# Patient Record
Sex: MALE | Race: WHITE | NOT HISPANIC OR LATINO | Employment: FULL TIME | URBAN - METROPOLITAN AREA
[De-identification: names, ages, dates, MRNs, and addresses within clinical notes are randomized per-mention and may not be internally consistent; named-entity substitution may affect disease eponyms.]

---

## 2020-04-01 ENCOUNTER — TELEPHONE (OUTPATIENT)
Dept: FAMILY MEDICINE CLINIC | Facility: CLINIC | Age: 50
End: 2020-04-01

## 2020-08-11 ENCOUNTER — OFFICE VISIT (OUTPATIENT)
Dept: FAMILY MEDICINE CLINIC | Facility: CLINIC | Age: 50
End: 2020-08-11
Payer: COMMERCIAL

## 2020-08-11 VITALS
DIASTOLIC BLOOD PRESSURE: 60 MMHG | WEIGHT: 157 LBS | OXYGEN SATURATION: 96 % | TEMPERATURE: 97.5 F | SYSTOLIC BLOOD PRESSURE: 118 MMHG | RESPIRATION RATE: 16 BRPM | BODY MASS INDEX: 24.64 KG/M2 | HEART RATE: 86 BPM | HEIGHT: 67 IN

## 2020-08-11 DIAGNOSIS — Z13.6 SCREENING FOR CARDIOVASCULAR, RESPIRATORY, AND GENITOURINARY DISEASES: ICD-10-CM

## 2020-08-11 DIAGNOSIS — Z00.00 HEALTHCARE MAINTENANCE: Primary | ICD-10-CM

## 2020-08-11 DIAGNOSIS — Z13.83 SCREENING FOR CARDIOVASCULAR, RESPIRATORY, AND GENITOURINARY DISEASES: ICD-10-CM

## 2020-08-11 DIAGNOSIS — Z12.5 PROSTATE CANCER SCREENING: ICD-10-CM

## 2020-08-11 DIAGNOSIS — Z71.89 ADVICE GIVEN ABOUT 2019 NOVEL CORONAVIRUS INFECTION: ICD-10-CM

## 2020-08-11 DIAGNOSIS — Z13.89 SCREENING FOR CARDIOVASCULAR, RESPIRATORY, AND GENITOURINARY DISEASES: ICD-10-CM

## 2020-08-11 DIAGNOSIS — B00.2 HERPES GINGIVOSTOMATITIS: ICD-10-CM

## 2020-08-11 DIAGNOSIS — Z13.1 SCREENING FOR DIABETES MELLITUS (DM): ICD-10-CM

## 2020-08-11 DIAGNOSIS — B35.1 ONYCHOMYCOSIS: ICD-10-CM

## 2020-08-11 DIAGNOSIS — F17.210 CIGARETTE NICOTINE DEPENDENCE WITHOUT COMPLICATION: ICD-10-CM

## 2020-08-11 DIAGNOSIS — Z12.11 COLON CANCER SCREENING: ICD-10-CM

## 2020-08-11 DIAGNOSIS — Z20.822 ENCOUNTER FOR SCREENING LABORATORY TESTING FOR SEVERE ACUTE RESPIRATORY SYNDROME CORONAVIRUS 2 (SARS-COV-2): ICD-10-CM

## 2020-08-11 DIAGNOSIS — J30.9 ALLERGIC RHINITIS, UNSPECIFIED SEASONALITY, UNSPECIFIED TRIGGER: ICD-10-CM

## 2020-08-11 PROCEDURE — 3008F BODY MASS INDEX DOCD: CPT | Performed by: FAMILY MEDICINE

## 2020-08-11 PROCEDURE — 4004F PT TOBACCO SCREEN RCVD TLK: CPT | Performed by: FAMILY MEDICINE

## 2020-08-11 PROCEDURE — 99386 PREV VISIT NEW AGE 40-64: CPT | Performed by: FAMILY MEDICINE

## 2020-08-11 PROCEDURE — 3725F SCREEN DEPRESSION PERFORMED: CPT | Performed by: FAMILY MEDICINE

## 2020-08-11 RX ORDER — TERBINAFINE HYDROCHLORIDE 250 MG/1
250 TABLET ORAL DAILY
Qty: 90 TABLET | Refills: 0 | Status: SHIPPED | OUTPATIENT
Start: 2020-08-11 | End: 2020-11-09

## 2020-08-11 RX ORDER — CETIRIZINE HYDROCHLORIDE 10 MG/1
10 TABLET, CHEWABLE ORAL DAILY
COMMUNITY

## 2020-08-11 RX ORDER — VALACYCLOVIR HYDROCHLORIDE 1 G/1
TABLET, FILM COATED ORAL
Qty: 40 TABLET | Refills: 1 | Status: SHIPPED | OUTPATIENT
Start: 2020-08-11 | End: 2022-01-14

## 2020-08-11 NOTE — PROGRESS NOTES
Assessment/Plan:    No problem-specific Assessment & Plan notes found for this encounter  Cpe today    hsv labialis stable, use instructed, not for aphthous ulcers  Screening labs  Tobacco use discouraged  AR stable  Onychomycosis new, start lamisil after LFTs normal, f/u in 6weeks for recheck, risks advised     Diagnoses and all orders for this visit:    Healthcare maintenance    Allergic rhinitis, unspecified seasonality, unspecified trigger    Cigarette nicotine dependence without complication    Colon cancer screening  -     Ambulatory referral to Gastroenterology; Future    Herpes gingivostomatitis  -     valACYclovir (VALTREX) 1,000 mg tablet; Take 2 grams at earliest onset of cold sore then repeat once more in 12 hours    Screening for cardiovascular, respiratory, and genitourinary diseases  -     Lipid Panel with Direct LDL reflex; Future    Screening for diabetes mellitus (DM)  -     Comprehensive metabolic panel; Future    Prostate cancer screening  -     PSA, Total Screen; Future    Onychomycosis  -     terbinafine (LamISIL) 250 mg tablet; Take 1 tablet (250 mg total) by mouth daily    Advice given about 2019 novel coronavirus infection  -     Novel Coronavirus (COVID-19), PCR [facility] - Collected at [facility] or [facility]; Future    Other orders  -     cetirizine (ZyrTEC) 10 MG chewable tablet; Chew 10 mg daily        Return in about 6 weeks (around 9/22/2020) for Recheck  Subjective:      Patient ID: Herminia Villalta is a 45 y o  male      Chief Complaint   Patient presents with    Physical Exam     jlopezcma     COVID-19       HPI  Valtrex for cold sores, helps, gets about 2x/year  Not seen in 3-4y    Travels a lot  [employer]    No labs in past 3-4y    No colonoscopy ever    Healthy diet  Garden  Veggies  Fish and meat  Some walking    Smokes   Risk aware  Addicted to nicotine per pt  Declines chantix    The following portions of the patient's history were reviewed and updated as appropriate: allergies, current medications, past family history, past medical history, past social history, past surgical history and problem list     Review of Systems   Constitutional: Negative for fever  Respiratory: Negative for shortness of breath  Current Outpatient Medications   Medication Sig Dispense Refill    cetirizine (ZyrTEC) 10 MG chewable tablet Chew 10 mg daily      valACYclovir (VALTREX) 1,000 mg tablet Take 2 grams at earliest onset of cold sore then repeat once more in 12 hours 40 tablet 1    terbinafine (LamISIL) 250 mg tablet Take 1 tablet (250 mg total) by mouth daily 90 tablet 0     No current facility-administered medications for this visit  Objective:    /60   Pulse 86   Temp 97 5 °F (36 4 °C)   Resp 16   Ht 5' 7" (1 702 m)   Wt 71 2 kg (157 lb)   SpO2 96%   BMI 24 59 kg/m²        Physical Exam  Vitals signs and nursing note reviewed  Constitutional:       Appearance: He is well-developed  He is not ill-appearing or diaphoretic  HENT:      Head: Normocephalic  Right Ear: Tympanic membrane normal       Left Ear: Tympanic membrane normal       Nose: No rhinorrhea  Mouth/Throat:      Pharynx: No posterior oropharyngeal erythema  Eyes:      General: No scleral icterus  Conjunctiva/sclera: Conjunctivae normal    Neck:      Musculoskeletal: Neck supple  No neck rigidity  Vascular: No carotid bruit  Cardiovascular:      Rate and Rhythm: Normal rate and regular rhythm  Heart sounds: No murmur  Pulmonary:      Effort: Pulmonary effort is normal  No respiratory distress  Breath sounds: No wheezing or rales  Abdominal:      General: There is no distension  Palpations: Abdomen is soft  Tenderness: There is no abdominal tenderness  Hernia: No hernia is present  Genitourinary:     Penis: Normal        Scrotum/Testes: Normal       Prostate: Normal       Rectum: Normal    Musculoskeletal:         General: No deformity     Skin: General: Skin is warm and dry  Coloration: Skin is not pale  Findings: No rash  Comments: B/l onychomycosis   Neurological:      Mental Status: He is alert  Motor: No weakness  Psychiatric:         Behavior: Behavior normal          Thought Content: Thought content normal          Tobacco Cessation Counseling: Tobacco cessation counseling was provided  The patient is sincerely urged to quit consumption of tobacco  He is not ready to quit tobacco  Medication options discussed              Omer Bai DO

## 2020-08-24 DIAGNOSIS — Z71.89 ADVICE GIVEN ABOUT 2019 NOVEL CORONAVIRUS INFECTION: ICD-10-CM

## 2020-08-24 PROCEDURE — U0003 INFECTIOUS AGENT DETECTION BY NUCLEIC ACID (DNA OR RNA); SEVERE ACUTE RESPIRATORY SYNDROME CORONAVIRUS 2 (SARS-COV-2) (CORONAVIRUS DISEASE [COVID-19]), AMPLIFIED PROBE TECHNIQUE, MAKING USE OF HIGH THROUGHPUT TECHNOLOGIES AS DESCRIBED BY CMS-2020-01-R: HCPCS | Performed by: FAMILY MEDICINE

## 2020-08-25 LAB
ALBUMIN SERPL-MCNC: 4.6 G/DL (ref 3.8–4.9)
ALBUMIN/GLOB SERPL: 2 {RATIO} (ref 1.2–2.2)
ALP SERPL-CCNC: 55 IU/L (ref 39–117)
ALT SERPL-CCNC: 16 IU/L (ref 0–44)
AST SERPL-CCNC: 17 IU/L (ref 0–40)
BILIRUB SERPL-MCNC: 0.4 MG/DL (ref 0–1.2)
BUN SERPL-MCNC: 12 MG/DL (ref 6–24)
BUN/CREAT SERPL: 13 (ref 9–20)
CALCIUM SERPL-MCNC: 9.8 MG/DL (ref 8.7–10.2)
CHLORIDE SERPL-SCNC: 103 MMOL/L (ref 96–106)
CHOLEST SERPL-MCNC: 235 MG/DL (ref 100–199)
CO2 SERPL-SCNC: 23 MMOL/L (ref 20–29)
CREAT SERPL-MCNC: 0.9 MG/DL (ref 0.76–1.27)
GLOBULIN SER-MCNC: 2.3 G/DL (ref 1.5–4.5)
GLUCOSE SERPL-MCNC: 100 MG/DL (ref 65–99)
HDLC SERPL-MCNC: 53 MG/DL
LDLC SERPL CALC-MCNC: 167 MG/DL (ref 0–99)
MICRODELETION SYND BLD/T FISH: NORMAL
POTASSIUM SERPL-SCNC: 4.4 MMOL/L (ref 3.5–5.2)
PROT SERPL-MCNC: 6.9 G/DL (ref 6–8.5)
PSA SERPL-MCNC: 3.1 NG/ML (ref 0–4)
SARS-COV-2 RNA SPEC QL NAA+PROBE: NOT DETECTED
SL AMB EGFR AFRICAN AMERICAN: 114 ML/MIN/1.73
SL AMB EGFR NON AFRICAN AMERICAN: 99 ML/MIN/1.73
SODIUM SERPL-SCNC: 139 MMOL/L (ref 134–144)
TRIGL SERPL-MCNC: 75 MG/DL (ref 0–149)

## 2020-09-22 ENCOUNTER — OFFICE VISIT (OUTPATIENT)
Dept: FAMILY MEDICINE CLINIC | Facility: CLINIC | Age: 50
End: 2020-09-22
Payer: COMMERCIAL

## 2020-09-22 VITALS
WEIGHT: 160.6 LBS | HEIGHT: 67 IN | BODY MASS INDEX: 25.21 KG/M2 | DIASTOLIC BLOOD PRESSURE: 70 MMHG | RESPIRATION RATE: 18 BRPM | SYSTOLIC BLOOD PRESSURE: 114 MMHG | HEART RATE: 68 BPM | TEMPERATURE: 96.7 F | OXYGEN SATURATION: 98 %

## 2020-09-22 DIAGNOSIS — R73.03 PREDIABETES: ICD-10-CM

## 2020-09-22 DIAGNOSIS — B35.1 ONYCHOMYCOSIS: Primary | ICD-10-CM

## 2020-09-22 PROCEDURE — 1036F TOBACCO NON-USER: CPT | Performed by: FAMILY MEDICINE

## 2020-09-22 PROCEDURE — 99213 OFFICE O/P EST LOW 20 MIN: CPT | Performed by: FAMILY MEDICINE

## 2020-09-22 NOTE — PROGRESS NOTES
Assessment/Plan:    No problem-specific Assessment & Plan notes found for this encounter  Onychomycosis, start lamisil, labs slips given for LFT after 6 and 12 w    Prediabetes, new, diet reviewed    Getting [id number] at work? Diagnoses and all orders for this visit:    Onychomycosis  -     Hepatic function panel; Future  -     Hepatic function panel; Future    Prediabetes        Return if symptoms worsen or fail to improve  Subjective:      Patient ID: Thiago Vargas is a 45 y o  male  Chief Complaint   Patient presents with    Follow-up     6wk f/u  Adelaida Campbell       HPI    Here for follow up  ascvd 3 7%  psa 3 1  Filled rx for lamisil but not started yet    Quit tob 10d ago  Doing ok for now    fbs 100  Prediabetes advised  Diet and carbs discussed  Generally eats very healthy per pt    fram score 3 7%    The following portions of the patient's history were reviewed and updated as appropriate: allergies, current medications, past family history, past medical history, past social history, past surgical history and problem list     Review of Systems   Respiratory: Negative for shortness of breath  Cardiovascular: Negative for chest pain  Gastrointestinal: Negative for abdominal pain  Current Outpatient Medications   Medication Sig Dispense Refill    cetirizine (ZyrTEC) 10 MG chewable tablet Chew 10 mg daily      terbinafine (LamISIL) 250 mg tablet Take 1 tablet (250 mg total) by mouth daily 90 tablet 0    valACYclovir (VALTREX) 1,000 mg tablet Take 2 grams at earliest onset of cold sore then repeat once more in 12 hours 40 tablet 1     No current facility-administered medications for this visit  Objective:    /70   Pulse 68   Temp (!) 96 7 °F (35 9 °C)   Resp 18   Ht 5' 7" (1 702 m)   Wt 72 8 kg (160 lb 9 6 oz)   SpO2 98%   BMI 25 15 kg/m²        Physical Exam  Vitals signs and nursing note reviewed  Constitutional:       General: He is not in acute distress  Appearance: Normal appearance  He is well-developed  He is not ill-appearing  HENT:      Head: Normocephalic  Right Ear: Tympanic membrane and ear canal normal       Left Ear: Tympanic membrane and ear canal normal       Nose: No rhinorrhea  Mouth/Throat:      Pharynx: No posterior oropharyngeal erythema  Eyes:      Conjunctiva/sclera: Conjunctivae normal    Neck:      Musculoskeletal: Neck supple  Cardiovascular:      Rate and Rhythm: Normal rate and regular rhythm  Heart sounds: No murmur  Pulmonary:      Effort: Pulmonary effort is normal  No respiratory distress  Abdominal:      General: There is no distension  Palpations: Abdomen is soft  There is no mass  Tenderness: There is no abdominal tenderness  Musculoskeletal:         General: No deformity  Skin:     General: Skin is warm and dry  Coloration: Skin is not pale  Neurological:      Mental Status: He is alert  Psychiatric:         Behavior: Behavior normal          Thought Content: Thought content normal          BMI Counseling: Body mass index is 25 15 kg/m²  The BMI is above normal  Nutrition recommendations include decreasing portion sizes and moderation in carbohydrate intake  Exercise recommendations include exercising 3-5 times per week  No pharmacotherapy was ordered                Wendy Tay DO

## 2020-10-23 PROBLEM — Z20.822 ENCOUNTER FOR SCREENING LABORATORY TESTING FOR SEVERE ACUTE RESPIRATORY SYNDROME CORONAVIRUS 2 (SARS-COV-2): Status: ACTIVE | Noted: 2020-10-23

## 2020-10-23 PROBLEM — Z11.52 ENCOUNTER FOR SCREENING LABORATORY TESTING FOR SEVERE ACUTE RESPIRATORY SYNDROME CORONAVIRUS 2 (SARS-COV-2): Status: ACTIVE | Noted: 2020-10-23

## 2022-01-14 ENCOUNTER — OFFICE VISIT (OUTPATIENT)
Dept: FAMILY MEDICINE CLINIC | Facility: CLINIC | Age: 52
End: 2022-01-14
Payer: COMMERCIAL

## 2022-01-14 VITALS
DIASTOLIC BLOOD PRESSURE: 70 MMHG | OXYGEN SATURATION: 100 % | SYSTOLIC BLOOD PRESSURE: 120 MMHG | RESPIRATION RATE: 20 BRPM | WEIGHT: 162 LBS | BODY MASS INDEX: 25.43 KG/M2 | HEART RATE: 96 BPM | HEIGHT: 67 IN | TEMPERATURE: 97.6 F

## 2022-01-14 DIAGNOSIS — Z12.11 COLON CANCER SCREENING: ICD-10-CM

## 2022-01-14 DIAGNOSIS — Z12.5 PROSTATE CANCER SCREENING: ICD-10-CM

## 2022-01-14 DIAGNOSIS — Z13.6 SCREENING FOR CARDIOVASCULAR, RESPIRATORY, AND GENITOURINARY DISEASES: ICD-10-CM

## 2022-01-14 DIAGNOSIS — B00.2 HERPES GINGIVOSTOMATITIS: ICD-10-CM

## 2022-01-14 DIAGNOSIS — R73.03 PREDIABETES: ICD-10-CM

## 2022-01-14 DIAGNOSIS — B35.1 ONYCHOMYCOSIS: ICD-10-CM

## 2022-01-14 DIAGNOSIS — Z13.83 SCREENING FOR CARDIOVASCULAR, RESPIRATORY, AND GENITOURINARY DISEASES: ICD-10-CM

## 2022-01-14 DIAGNOSIS — Z00.00 HEALTHCARE MAINTENANCE: Primary | ICD-10-CM

## 2022-01-14 DIAGNOSIS — Z13.1 SCREENING FOR DIABETES MELLITUS (DM): ICD-10-CM

## 2022-01-14 DIAGNOSIS — K64.4 EXTERNAL HEMORRHOIDS: ICD-10-CM

## 2022-01-14 DIAGNOSIS — Z13.89 SCREENING FOR CARDIOVASCULAR, RESPIRATORY, AND GENITOURINARY DISEASES: ICD-10-CM

## 2022-01-14 PROCEDURE — 3008F BODY MASS INDEX DOCD: CPT | Performed by: FAMILY MEDICINE

## 2022-01-14 PROCEDURE — 3725F SCREEN DEPRESSION PERFORMED: CPT | Performed by: FAMILY MEDICINE

## 2022-01-14 PROCEDURE — 1036F TOBACCO NON-USER: CPT | Performed by: FAMILY MEDICINE

## 2022-01-14 PROCEDURE — 99396 PREV VISIT EST AGE 40-64: CPT | Performed by: FAMILY MEDICINE

## 2022-01-14 NOTE — PROGRESS NOTES
Assessment/Plan:    No problem-specific Assessment & Plan notes found for this encounter  cpe    Colonoscopy preferred over cologuard as explained    hsv labialis better with valtrex, try to prevent when possible    Persistent toenail deformity x 1, left foot, s/p lamisil po in 2020  Offer podiatry for treatment options    Ext hemorrhoids noted, he will discuss tx options with gi     Diagnoses and all orders for this visit:    Healthcare maintenance    Colon cancer screening  -     Ambulatory referral to Gastroenterology; Future    Herpes gingivostomatitis    Prediabetes  -     Hemoglobin A1C; Future    Screening for cardiovascular, respiratory, and genitourinary diseases  -     Comprehensive metabolic panel; Future    Screening for diabetes mellitus (DM)  -     Lipid Panel with Direct LDL reflex; Future    Prostate cancer screening  -     PSA, Total Screen; Future    Onychomycosis  -     Ambulatory referral to Podiatry; Future    External hemorrhoids        Return if symptoms worsen or fail to improve  Subjective:      Patient ID: Scar Quiñonez is a 45 y o  male  Chief Complaint   Patient presents with    Physical Exam     jlopezcma        HPI  Quit tob 18m ago  Over 20 pack year history  Had 3 pfizers    Valtrex prn helps    The following portions of the patient's history were reviewed and updated as appropriate: allergies, current medications, past family history, past medical history, past social history, past surgical history and problem list     Review of Systems   Constitutional: Negative for fever  Respiratory: Negative for shortness of breath  Current Outpatient Medications   Medication Sig Dispense Refill    cetirizine (ZyrTEC) 10 MG chewable tablet Chew 10 mg daily      valACYclovir (VALTREX) 1,000 mg tablet Take 2 grams at earliest onset of cold sore then repeat once more in 12 hours 40 tablet 1     No current facility-administered medications for this visit  Objective:    /70   Pulse 96   Temp 97 6 °F (36 4 °C)   Resp 20   Ht 5' 7" (1 702 m)   Wt 73 5 kg (162 lb)   SpO2 100%   BMI 25 37 kg/m²        Physical Exam  Vitals and nursing note reviewed  Constitutional:       General: He is not in acute distress  Appearance: He is well-developed  He is not ill-appearing  HENT:      Head: Normocephalic  Right Ear: Tympanic membrane normal       Left Ear: Tympanic membrane normal       Nose: No congestion  Eyes:      General: No scleral icterus  Conjunctiva/sclera: Conjunctivae normal    Cardiovascular:      Rate and Rhythm: Normal rate and regular rhythm  Heart sounds: No murmur heard  Pulmonary:      Effort: Pulmonary effort is normal  No respiratory distress  Breath sounds: No wheezing  Abdominal:      General: There is no distension  Palpations: Abdomen is soft  Tenderness: There is no abdominal tenderness  Hernia: No hernia is present  Genitourinary:     Penis: Normal        Testes: Normal       Prostate: Normal       Rectum: Normal    Musculoskeletal:         General: Deformity present  Cervical back: Neck supple  Right lower leg: No edema  Left lower leg: No edema  Comments: Left toenail x 1   Skin:     General: Skin is warm and dry  Coloration: Skin is not pale  Neurological:      Mental Status: He is alert  Motor: No weakness  Gait: Gait normal    Psychiatric:         Mood and Affect: Mood normal          Behavior: Behavior normal          Thought Content:  Thought content normal                 Elizabeth Calvert DO

## 2022-02-02 ENCOUNTER — TELEPHONE (OUTPATIENT)
Dept: GASTROENTEROLOGY | Facility: CLINIC | Age: 52
End: 2022-02-02

## 2022-02-02 NOTE — TELEPHONE ENCOUNTER
Received order in [address] [place] [address] for pt to be scheduled for colon screening per Dr Evaristo Lovett  Pt is a new pt to us  I lmom for pt to please call back to schedule a colon screening per Dr Evaristo Lovett  Will call pt again in one week if do not hear back from him

## 2022-02-09 NOTE — TELEPHONE ENCOUNTER
I lmoc for pt to please call back to schedule a colon screening per Dr Antionette Aleman  Will call pt again in two weeks if do not hear back from him

## 2022-02-23 NOTE — TELEPHONE ENCOUNTER
I lmom for pt to please call back to schedule a colonoscopy screening per Dr Antionette Aleman  Will wait for pt's call back at this time

## 2022-09-15 ENCOUNTER — VBI (OUTPATIENT)
Dept: ADMINISTRATIVE | Facility: OTHER | Age: 52
End: 2022-09-15

## 2022-10-21 ENCOUNTER — OFFICE VISIT (OUTPATIENT)
Dept: FAMILY MEDICINE CLINIC | Facility: CLINIC | Age: 52
End: 2022-10-21
Payer: COMMERCIAL

## 2022-10-21 VITALS
RESPIRATION RATE: 20 BRPM | OXYGEN SATURATION: 98 % | HEIGHT: 67 IN | TEMPERATURE: 98.1 F | DIASTOLIC BLOOD PRESSURE: 70 MMHG | HEART RATE: 97 BPM | BODY MASS INDEX: 26.37 KG/M2 | WEIGHT: 168 LBS | SYSTOLIC BLOOD PRESSURE: 120 MMHG

## 2022-10-21 DIAGNOSIS — J01.00 ACUTE NON-RECURRENT MAXILLARY SINUSITIS: ICD-10-CM

## 2022-10-21 DIAGNOSIS — U07.1 COVID-19 VIRUS INFECTION: Primary | ICD-10-CM

## 2022-10-21 DIAGNOSIS — B00.2 HERPES GINGIVOSTOMATITIS: ICD-10-CM

## 2022-10-21 PROCEDURE — 99213 OFFICE O/P EST LOW 20 MIN: CPT | Performed by: NURSE PRACTITIONER

## 2022-10-21 RX ORDER — AZITHROMYCIN 250 MG/1
TABLET, FILM COATED ORAL
Qty: 6 TABLET | Refills: 0 | Status: SHIPPED | OUTPATIENT
Start: 2022-10-21 | End: 2022-10-26

## 2022-10-21 RX ORDER — VALACYCLOVIR HYDROCHLORIDE 1 G/1
TABLET, FILM COATED ORAL
Qty: 40 TABLET | Refills: 1 | Status: SHIPPED | OUTPATIENT
Start: 2022-10-21 | End: 2024-12-30

## 2022-10-21 RX ORDER — NIRMATRELVIR AND RITONAVIR 300-100 MG
3 KIT ORAL 2 TIMES DAILY
Qty: 30 TABLET | Refills: 0 | Status: SHIPPED | OUTPATIENT
Start: 2022-10-21 | End: 2022-10-26

## 2022-10-21 NOTE — PROGRESS NOTES
COVID-19 Outpatient Progress Note    Assessment/Plan:    Problem List Items Addressed This Visit        Digestive    Herpes gingivostomatitis    Relevant Medications    nirmatrelvir & ritonavir (Paxlovid, 300/100,) tablet therapy pack    valACYclovir (VALTREX) 1,000 mg tablet      Other Visit Diagnoses     COVID-19 virus infection    -  Primary    Relevant Medications    nirmatrelvir & ritonavir (Paxlovid, 300/100,) tablet therapy pack    valACYclovir (VALTREX) 1,000 mg tablet    Acute non-recurrent maxillary sinusitis        Relevant Medications    azithromycin (ZITHROMAX) 250 mg tablet         Disposition:     Patient has asymptomatic or mild COVID-19 infection  Based off CDC guidelines, they were recommended to isolate for 5 days  If they are asymptomatic or symptoms are improving with no fevers in the past 24 hours, isolation may be ended followed by 5 days of wearing a mask when around othes to minimize risk of infecting others  If still have a fever or other symptoms have not improved, continue to isolate until they improve  Regardless of when they end isolation, avoid being around people who are more likely to get very sick from COVID-19 until at least day 11  Discussed symptom directed medication options with patient  Discussed vitamin D, vitamin C, and/or zinc supplementation with patient  Patient meets criteria for PAXLOVID and they have been counseled appropriately according to EUA documentation released by the FDA  After discussion, patient agrees to treatment  Constantin Mendes is an investigational medicine used to treat mild-to-moderate COVID-19 in adults and children (15years of age and older weighing at least 80 pounds (40 kg)) with positive results of direct SARS-CoV-2 viral testing, and who are at high risk for progression to severe COVID-19, including hospitalization or death  PAXLOVID is investigational because it is still being studied   There is limited information about the safety and effectiveness of using PAXLOVID to treat people with mild-to-moderate COVID-19  The FDA has authorized the emergency use of PAXLOVID for the treatment of mild-tomoderate COVID-19 in adults and children (15years of age and older weighing at least 80 pounds (40 kg)) with a positive test for the virus that causes COVID-19, and who are at high risk for progression to severe COVID-19, including hospitalization or death, under an EUA  What should I tell my healthcare provider before I take PAXLOVID? Tell your healthcare provider if you:  - Have any allergies  - Have liver or kidney disease  - Are pregnant or plan to become pregnant  - Are breastfeeding a child  - Have any serious illnesses    Tell your healthcare provider about all the medicines you take, including prescription and over-the-counter medicines, vitamins, and herbal supplements  Some medicines may interact with PAXLOVID and may cause serious side effects  Keep a list of your medicines to show your healthcare provider and pharmacist when you get a new medicine  You can ask your healthcare provider or pharmacist for a list of medicines that interact with PAXLOVID  Do not start taking a new medicine without telling your healthcare provider  Your healthcare provider can tell you if it is safe to take PAXLOVID with other medicines  Tell your healthcare provider if you are taking combined hormonal contraceptive  PAXLOVID may affect how your birth control pills work  Females who are able to become pregnant should use another effective alternative form of contraception or an additional barrier method of contraception  Talk to your healthcare provider if you have any questions about contraceptive methods that might be right for you  How do I take PAXLOVID? PAXLOVID consists of 2 medicines: nirmatrelvir and ritonavir    - Take 2 pink tablets of nirmatrelvir with 1 white tablet of ritonavir by mouth 2 times each day (in the morning and in the evening) for 5 days  For each dose, take all 3 tablets at the same time  - If you have kidney disease, talk to your healthcare provider  You may need a different dose  - Swallow the tablets whole  Do not chew, break, or crush the tablets  - Take PAXLOVID with or without food  - Do not stop taking PAXLOVID without talking to your healthcare provider, even if you feel better  - If you miss a dose of PAXLOVID within 8 hours of the time it is usually taken, take it as soon as you remember  If you miss a dose by more than 8 hours, skip the missed dose and take the next dose at your regular time  Do not take 2 doses of PAXLOVID at the same time  - If you take too much PAXLOVID, call your healthcare provider or go to the nearest hospital emergency room right away  - If you are taking a ritonavir- or cobicistat-containing medicine to treat hepatitis C or Human Immunodeficiency Virus (HIV), you should continue to take your medicine as prescribed by your healthcare provider   - Talk to your healthcare provider if you do not feel better or if you feel worse after 5 days  Who should generally not take PAXLOVID? Do not take PAXLOVID if:  You are allergic to nirmatrelvir, ritonavir, or any of the ingredients in PAXLOVID  You are taking any of the following medicines:  - Alfuzosin  - Pethidine, piroxicam, propoxyphene  - Ranolazine  - Amiodarone, dronedarone, flecainide, propafenone, quinidine  - Colchicine  - Lurasidone, pimozide, clozapine  - Dihydroergotamine, ergotamine, methylergonovine  - Lovastatin, simvastatin  - Sildenafil (Revatio®) for pulmonary arterial hypertension (PAH)  - Triazolam, oral midazolam  - Apalutamide  - Carbamazepine, phenobarbital, phenytoin  - Rifampin  - St  Kirk’s Wort (hypericum perforatum)    What are the important possible side effects of PAXLOVID? Possible side effects of PAXLOVID are:  - Liver Problems   Tell your healthcare provider right away if you have any of these signs and symptoms of liver problems: loss of appetite, yellowing of your skin and the whites of eyes (jaundice), dark-colored urine, pale colored stools and itchy skin, stomach area (abdominal) pain  - Resistance to HIV Medicines  If you have untreated HIV infection, PAXLOVID may lead to some HIV medicines not working as well in the future  - Other possible side effects include: altered sense of taste, diarrhea, high blood pressure, or muscle aches    These are not all the possible side effects of PAXLOVID  Not many people have taken PAXLOVID  Serious and unexpected side effects may happen  Justice [place]dee is still being studied, so it is possible that all of the risks are not known at this time  What other treatment choices are there? Like Man Velasquez may allow for the emergency use of other medicines to treat people with COVID-19  Go to https://[employer]/ for information on the emergency use of other medicines that are authorized by FDA to treat people with COVID-19  Your healthcare provider may talk with you about clinical trials for which you may be eligible  It is your choice to be treated or not to be treated with PAXLOVID  Should you decide not to receive it or for your child not to receive it, it will not change your standard medical care  What if I am pregnant or breastfeeding? There is no experience treating pregnant women or breastfeeding mothers with PAXLOVID  For a mother and unborn baby, the benefit of taking PAXLOVID may be greater than the risk from the treatment  If you are pregnant, discuss your options and specific situation with your healthcare provider  It is recommended that you use effective barrier contraception or do not have sexual activity while taking PAXLOVID  If you are breastfeeding, discuss your options and specific situation with your healthcare provider       How do I report side effects with PAXLOVID? Contact your healthcare provider if you have any side effects that bother you or do not go away  Report side effects to FDA MedWatch at www fda gov/medwatch or call 6-238-MUT7928 or you can report side effects to [facility]  at the contact information provided below  Website Fax number Telephone number   [employer] [phone]     How should I store Alver Rolls? Store PAXLOVID tablets at room temperature between 68°F to 77°F (20°C to 25°C)  Full fact sheet for patients, parents, and caregivers can be found at: [email or web address]    I have spent 11 minutes directly with the patient  F/u for any persistent/worsening symptoms        Encounter provider: GLENN Mchugh     Provider located at: [address] [address]     Recent Visits  No visits were found meeting these conditions  Showing recent visits within past 7 days and meeting all other requirements  Today's Visits  Date Type Provider Dept   10/21/22 Office Visit Mario Mchugh today's visits and meeting all other requirements  Future Appointments  No visits were found meeting these conditions  Showing future appointments within next 150 days and meeting all other requirements     Subjective:   Mauricio Khan is a 47 y o  male who has been screened for COVID-19  Symptom change since last report: unchanged  Patient's symptoms include fever (low grade), fatigue, nasal congestion, sore throat, cough (mild), nausea, diarrhea and headache  Patient denies chills, malaise, rhinorrhea, anosmia, loss of taste, shortness of breath, chest tightness, abdominal pain, vomiting and myalgias  - Date of symptom onset: 10/20/2022  - Date of positive COVID-19 test: 10/20/2022  Type of test: Home antigen   Patient with typical symptoms of COVID-19 and they attest that they were positive on home rapid antigen testing  Image of positive result is not able to be uploaded into their chart  COVID-19 vaccination status: Fully vaccinated with booster    Carlos has been staying home and has isolated themselves in his home  He is taking care to not share personal items and is cleaning all surfaces that are touched often, like counters, tabletops, and doorknobs using household cleaning sprays or wipes  He is wearing a mask when he leaves his room  Was in [facility]  No prior Covid infection, fully vaccinated with 2 boosters      Lab Results   Component Value Date    SARSCOV2 Not Detected 08/24/2020       Review of Systems   Constitutional: Positive for fatigue and fever (low grade)  Negative for chills  HENT: Positive for congestion and sore throat  Negative for rhinorrhea  Respiratory: Positive for cough (mild)  Negative for chest tightness and shortness of breath  Gastrointestinal: Positive for diarrhea and nausea  Negative for abdominal pain and vomiting  Musculoskeletal: Negative for myalgias  Neurological: Positive for headaches  Current Outpatient Medications on File Prior to Visit   Medication Sig   • cetirizine (ZyrTEC) 10 MG chewable tablet Chew 10 mg daily   • [DISCONTINUED] valACYclovir (VALTREX) 1,000 mg tablet Take 2 grams at earliest onset of cold sore then repeat once more in 12 hours       Objective:    /70   Pulse 97   Temp 98 1 °F (36 7 °C)   Resp 20   Ht 5' 7" (1 702 m)   Wt 76 2 kg (168 lb)   SpO2 98%   BMI 26 31 kg/m²      Physical Exam  Vitals and nursing note reviewed  Constitutional:       Appearance: Normal appearance  He is well-developed  He is not ill-appearing  HENT:      Head: Normocephalic and atraumatic        Right Ear: Tympanic membrane normal       Left Ear: Tympanic membrane normal    Eyes:      Conjunctiva/sclera: Conjunctivae normal    Cardiovascular:      Rate and Rhythm: Normal rate and regular rhythm  Pulses: Normal pulses  Heart sounds: No murmur heard  Pulmonary:      Effort: Pulmonary effort is normal  No respiratory distress  Breath sounds: Normal breath sounds  Abdominal:      Palpations: Abdomen is soft  Tenderness: There is no abdominal tenderness  Musculoskeletal:      Cervical back: Neck supple  Lymphadenopathy:      Cervical: No cervical adenopathy  Skin:     General: Skin is warm and dry  Neurological:      Mental Status: He is alert     Psychiatric:         Mood and Affect: Mood normal          Behavior: Behavior normal        GLENN Robles

## 2024-02-21 PROBLEM — Z13.1 SCREENING FOR DIABETES MELLITUS (DM): Status: RESOLVED | Noted: 2020-08-11 | Resolved: 2024-02-21

## 2024-02-21 PROBLEM — Z00.00 HEALTHCARE MAINTENANCE: Status: RESOLVED | Noted: 2020-08-11 | Resolved: 2024-02-21

## 2024-02-21 PROBLEM — Z12.11 COLON CANCER SCREENING: Status: RESOLVED | Noted: 2020-08-11 | Resolved: 2024-02-21

## 2024-02-21 PROBLEM — Z12.5 PROSTATE CANCER SCREENING: Status: RESOLVED | Noted: 2020-08-11 | Resolved: 2024-02-21

## 2024-02-21 PROBLEM — Z13.83 SCREENING FOR CARDIOVASCULAR, RESPIRATORY, AND GENITOURINARY DISEASES: Status: RESOLVED | Noted: 2020-08-11 | Resolved: 2024-02-21

## 2024-02-21 PROBLEM — Z13.89 SCREENING FOR CARDIOVASCULAR, RESPIRATORY, AND GENITOURINARY DISEASES: Status: RESOLVED | Noted: 2020-08-11 | Resolved: 2024-02-21

## 2024-02-21 PROBLEM — Z13.6 SCREENING FOR CARDIOVASCULAR, RESPIRATORY, AND GENITOURINARY DISEASES: Status: RESOLVED | Noted: 2020-08-11 | Resolved: 2024-02-21

## 2024-02-26 DIAGNOSIS — Z13.89 SCREENING FOR CARDIOVASCULAR, RESPIRATORY, AND GENITOURINARY DISEASES: ICD-10-CM

## 2024-02-26 DIAGNOSIS — Z13.1 SCREENING FOR DIABETES MELLITUS (DM): ICD-10-CM

## 2024-02-26 DIAGNOSIS — Z12.5 PROSTATE CANCER SCREENING: ICD-10-CM

## 2024-02-26 DIAGNOSIS — Z13.6 SCREENING FOR CARDIOVASCULAR, RESPIRATORY, AND GENITOURINARY DISEASES: ICD-10-CM

## 2024-02-26 DIAGNOSIS — Z00.00 HEALTHCARE MAINTENANCE: ICD-10-CM

## 2024-02-26 DIAGNOSIS — Z13.83 SCREENING FOR CARDIOVASCULAR, RESPIRATORY, AND GENITOURINARY DISEASES: ICD-10-CM

## 2024-02-26 DIAGNOSIS — R73.03 PREDIABETES: Primary | ICD-10-CM

## 2024-03-09 PROBLEM — Z11.52 ENCOUNTER FOR SCREENING LABORATORY TESTING FOR SEVERE ACUTE RESPIRATORY SYNDROME CORONAVIRUS 2 (SARS-COV-2): Status: RESOLVED | Noted: 2020-10-23 | Resolved: 2024-03-09

## 2024-03-09 PROBLEM — J31.0 CHRONIC RHINITIS: Status: ACTIVE | Noted: 2020-08-11

## 2024-03-11 ENCOUNTER — OFFICE VISIT (OUTPATIENT)
Dept: FAMILY MEDICINE CLINIC | Facility: CLINIC | Age: 54
End: 2024-03-11
Payer: COMMERCIAL

## 2024-03-11 VITALS
RESPIRATION RATE: 12 BRPM | TEMPERATURE: 96.7 F | SYSTOLIC BLOOD PRESSURE: 118 MMHG | HEIGHT: 67 IN | DIASTOLIC BLOOD PRESSURE: 70 MMHG | WEIGHT: 166.4 LBS | BODY MASS INDEX: 26.12 KG/M2 | HEART RATE: 76 BPM

## 2024-03-11 DIAGNOSIS — Z00.00 HEALTHCARE MAINTENANCE: Primary | ICD-10-CM

## 2024-03-11 DIAGNOSIS — L60.8 TOENAIL DEFORMITY: ICD-10-CM

## 2024-03-11 DIAGNOSIS — Z12.11 COLON CANCER SCREENING: ICD-10-CM

## 2024-03-11 DIAGNOSIS — R51.9 NONINTRACTABLE EPISODIC HEADACHE, UNSPECIFIED HEADACHE TYPE: ICD-10-CM

## 2024-03-11 DIAGNOSIS — Z72.0 TOBACCO USE: ICD-10-CM

## 2024-03-11 DIAGNOSIS — H02.402 PTOSIS OF EYELID, LEFT: ICD-10-CM

## 2024-03-11 DIAGNOSIS — N52.9 ERECTILE DYSFUNCTION, UNSPECIFIED ERECTILE DYSFUNCTION TYPE: ICD-10-CM

## 2024-03-11 PROCEDURE — 99396 PREV VISIT EST AGE 40-64: CPT | Performed by: FAMILY MEDICINE

## 2024-03-11 PROCEDURE — 99214 OFFICE O/P EST MOD 30 MIN: CPT | Performed by: FAMILY MEDICINE

## 2024-03-11 RX ORDER — SILDENAFIL 100 MG/1
100 TABLET, FILM COATED ORAL DAILY PRN
Qty: 30 TABLET | Refills: 5 | Status: SHIPPED | OUTPATIENT
Start: 2024-03-11

## 2024-03-11 NOTE — PROGRESS NOTES
"Assessment/Plan:    No problem-specific Assessment & Plan notes found for this encounter.    Cpe  Diet/exercise discussed  Screening labs ordered    Other issues  Left eye ptosis  Normal CN exam  Offer MRI if covered  Offer ophthalmology, Dr Josseline GIANG  New  Risks/benefits of viagra discussed  Rx sent    Tob use  He restarted on own after asking someone for a cigarettte  Risks of use aware  LDCT criteria advised    Toe nail deformity  Persistent despite full course lamisil completed over 1y ago  Suggest podiatry eval    You can call our office for a \"nurse visit\" for the Shingrix vaccine.     Diagnoses and all orders for this visit:    Healthcare maintenance    Colon cancer screening  -     Ambulatory referral to Gastroenterology; Future    Ptosis of eyelid, left  -     MRI brain w wo contrast; Future  -     Ambulatory Referral to Ophthalmology; Future    Nonintractable episodic headache, unspecified headache type  -     MRI brain w wo contrast; Future    Tobacco use  -     CT lung screening program; Future    Toenail deformity  Comments:  left #2  Orders:  -     Ambulatory referral to Podiatry; Future    Erectile dysfunction, unspecified erectile dysfunction type  -     sildenafil (VIAGRA) 100 mg tablet; Take 1 tablet (100 mg total) by mouth daily as needed for erectile dysfunction        Return if symptoms worsen or fail to improve.    Subjective:      Patient ID: Carlos Handley is a 53 y.o. male.    Chief Complaint   Patient presents with    Physical Exam     Concerns about  possible ptosis of left eye   YC       HPI  Here for cpe  Less red meat  Has veggies  Moderate carb  Wt about same  Just signed up with exercise megan and feels better    1/2 to 1ppd for about 40 years    Toe nail issue  #2 on left despite 12w lamisil over 1.5y ago    ED   Can achieve and penetrate  Loses before organism  No KINSEY or CP with exertion    The following portions of the patient's history were reviewed and updated as appropriate: " "allergies, current medications, past family history, past medical history, past social history, past surgical history and problem list.    Review of Systems   Constitutional:  Negative for chills and fever.   Eyes:  Negative for redness and visual disturbance.   Cardiovascular:  Negative for chest pain.         Current Outpatient Medications   Medication Sig Dispense Refill    cetirizine (ZyrTEC) 10 MG chewable tablet Chew 10 mg daily      sildenafil (VIAGRA) 100 mg tablet Take 1 tablet (100 mg total) by mouth daily as needed for erectile dysfunction 30 tablet 5    valACYclovir (VALTREX) 1,000 mg tablet Take 2 grams at earliest onset of cold sore then repeat once more in 12 hours (Patient taking differently: Take 2 grams at earliest onset of cold sore then repeat once more in 12 hours  As needed) 40 tablet 1     No current facility-administered medications for this visit.       Objective:    /70   Pulse 76   Temp (!) 96.7 °F (35.9 °C) (Tympanic)   Resp 12   Ht 5' 6.63\" (1.692 m)   Wt 75.5 kg (166 lb 6.4 oz)   BMI 26.35 kg/m²        Physical Exam  Vitals and nursing note reviewed.   Constitutional:       General: He is not in acute distress.     Appearance: He is well-developed. He is not ill-appearing.   HENT:      Head: Normocephalic.      Right Ear: Tympanic membrane and ear canal normal.      Left Ear: Tympanic membrane and ear canal normal.      Nose: No congestion.      Mouth/Throat:      Pharynx: No oropharyngeal exudate.   Eyes:      General: No scleral icterus.     Conjunctiva/sclera: Conjunctivae normal.      Comments: Slight left ptosis   Neck:      Vascular: No carotid bruit.   Cardiovascular:      Rate and Rhythm: Normal rate and regular rhythm.      Heart sounds: No murmur heard.  Pulmonary:      Effort: Pulmonary effort is normal. No respiratory distress.      Breath sounds: No wheezing.   Abdominal:      General: There is no distension.      Palpations: Abdomen is soft.      Hernia: No " hernia is present.   Genitourinary:     Penis: Normal.       Testes: Normal.      Prostate: Normal.      Rectum: Normal.   Musculoskeletal:         General: Deformity present.      Cervical back: Neck supple.      Right lower leg: No edema.      Left lower leg: No edema.      Comments: Nail #2 left foot   Skin:     General: Skin is warm and dry.      Coloration: Skin is not jaundiced or pale.   Neurological:      Mental Status: He is alert.      Motor: No weakness.      Gait: Gait normal.   Psychiatric:         Mood and Affect: Mood normal.         Behavior: Behavior normal.         Thought Content: Thought content normal.                Maverick Hernandez DO

## 2024-03-18 ENCOUNTER — OFFICE VISIT (OUTPATIENT)
Age: 54
End: 2024-03-18
Payer: COMMERCIAL

## 2024-03-18 VITALS
BODY MASS INDEX: 26.06 KG/M2 | DIASTOLIC BLOOD PRESSURE: 70 MMHG | HEART RATE: 80 BPM | WEIGHT: 166 LBS | SYSTOLIC BLOOD PRESSURE: 130 MMHG | HEIGHT: 67 IN

## 2024-03-18 DIAGNOSIS — B35.1 ONYCHOMYCOSIS: Primary | ICD-10-CM

## 2024-03-18 DIAGNOSIS — L60.8 TOENAIL DEFORMITY: ICD-10-CM

## 2024-03-18 PROCEDURE — 88305 TISSUE EXAM BY PATHOLOGIST: CPT | Performed by: STUDENT IN AN ORGANIZED HEALTH CARE EDUCATION/TRAINING PROGRAM

## 2024-03-18 PROCEDURE — 88312 SPECIAL STAINS GROUP 1: CPT | Performed by: STUDENT IN AN ORGANIZED HEALTH CARE EDUCATION/TRAINING PROGRAM

## 2024-03-18 PROCEDURE — 99203 OFFICE O/P NEW LOW 30 MIN: CPT | Performed by: STUDENT IN AN ORGANIZED HEALTH CARE EDUCATION/TRAINING PROGRAM

## 2024-03-18 NOTE — PROGRESS NOTES
"This patient was seen on  3/18/2024 .    My role is Foot , Ankle, and Wound Specialist    ASSESSMENT     Diagnoses and all orders for this visit:    Onychomycosis  -     Tissue Exam; Future  -     Tissue Exam    Toenail deformity  Comments:  left #2  Orders:  -     Ambulatory referral to Podiatry         Problem List Items Addressed This Visit          Musculoskeletal and Integument    Onychomycosis - Primary    Relevant Orders    Tissue Exam    Toenail deformity     PLAN  -Patient was educated regarding their condition  -Biopsy of nail sent for PAS stain  -If the cultures come back positive, we will pursue a course of PO terbinafine  -F/u hepatic function panel in anticipation of terbinafine treatment  -RTC as needed for new or worsening podiatric concerns    SUBJECTIVE    Chief Complaint:  Discolored toenail, left foot     Patient ID: Carlos Handley     3/18/2024: Carlos is a pleasant 53-year-old male who presents today with concern for discolored toenails.  He states that this has been present for a long time to his left second digit.  He states that now the discoloration is starting to his right second digit.  He denies any pain to his bilateral feet.  He states that he did attempt a course of p.o. terbinafine 2 years ago which unfortunately was unsuccessful.        The following portions of the patient's history were reviewed and updated as appropriate: allergies, current medications, past family history, past medical history, past social history, past surgical history and problem list.    Review of Systems   Constitutional: Negative.    Respiratory: Negative.     Cardiovascular: Negative.    Gastrointestinal: Negative.    Genitourinary: Negative.    Musculoskeletal: Negative.    Skin:  Positive for color change.         OBJECTIVE      /70   Pulse 80   Ht 5' 7\" (1.702 m)   Wt 75.3 kg (166 lb)   BMI 26.00 kg/m²        Physical Exam  Constitutional:       Appearance: Normal appearance.   HENT:      " Head: Normocephalic and atraumatic.   Eyes:      General:         Right eye: No discharge.         Left eye: No discharge.   Cardiovascular:      Rate and Rhythm: Normal rate and regular rhythm.      Pulses:           Dorsalis pedis pulses are 2+ on the right side and 2+ on the left side.        Posterior tibial pulses are 2+ on the right side and 2+ on the left side.   Pulmonary:      Effort: Pulmonary effort is normal.      Breath sounds: Normal breath sounds.   Skin:     General: Skin is warm.      Capillary Refill: Capillary refill takes less than 2 seconds.   Neurological:      Mental Status: He is alert and oriented to person, place, and time.      Sensory: Sensation is intact. No sensory deficit.   Psychiatric:         Mood and Affect: Mood normal.         Vascular:  -DP and PT pulses intact b/l  -Capillary refill time <2 sec b/l    MSK:  -Pain on palpation negative  -No gross deformities noted   -MMT is 5/5 to all muscle compartments of the lower extremity    Neuro:  -Light sensation intact bilaterally  -Protective sensation intact bilaterally    Derm:  -No lesions, abrasions, or open wounds noted  -No noted interdigital maceration, peeling, malodor  -Patient's toe nails x2 are elongated, thickened, discolored, and dystrophic with subungual debris: These findings are consistent with onychomycosis

## 2024-03-19 ENCOUNTER — TELEPHONE (OUTPATIENT)
Age: 54
End: 2024-03-19

## 2024-03-19 NOTE — TELEPHONE ENCOUNTER
COLONOSCOPY  MIRALAX/Dulcolax Bowel Preparation Instructions    The OR/GI Lab will contact you the evening prior to your procedure with your exact arrival time.    Our practice requires a 1 week notice for any cancellations or rescheduling. We kindly ask that you immediately notify us of any changes including any new medications that are prescribed. Thank you for your cooperation.     WEEK BEFORE YOUR PROCEDURE:  Stop taking Iron tablets.  5 days prior, AVOID vegetables and fruits with skins or seeds, nuts, corn, popcorn and whole grain breads.   Purchase: One (1) 238-gram container of Miralax (polyethylene glycol 3350), four (4) 5 mg Dulcolax (bisacodyl) tablets, and one (1) 64-ounce bottle of Gatorade (sports drink) - no red, orange, or purple. These may be purchased at any pharmacy without a prescription. Generic products are permissible.   Arrange responsible transportation for day of the procedure.     DAY BEFORE THE PROCEDURE:   CLEAR liquids only for entire day prior. Nothing red, orange or purple.    You MAY have:                                                               Soda  Water  Broth Gatorade  Jello  Popsicles Coffee/tea without milk/creamer     YOU MAY NOT HAVE:  Solid foods   Milk and milk products    Juice with pulp    BOWEL PREPARATION:  Includes: One (1) 238-gram container of Miralax (polyethylene glycol 3350), four (4) 5 mg Dulcolax (bisacodyl) tablets, and one (1) 64-ounce bottle of Gatorade (sports drink).  Preparation may be refrigerated.  Entire bowel prep should be completed.     Afternoon before the procedure (2:00 pm - 5:00 pm):    Take two (2) 5 mg Dulcolax laxative tablets.     Evening before the procedure (6:00 pm):  Mix entire container of Miralax with one (1) 64-ounce bottle of Gatorade and shake until all medication is dissolved.   Begin drinking solution. Drink an eight (8) ounce cup every 10-15 minutes until you have consumed half (32 ounces) of the solution.  Refrigerate  remaining solution.    Night before the procedure (8:00 pm):  Take two (2) 5 mg Dulcolax laxative tablets.     Beginning 5 hours before your procedure:  Drink the remaining amount of prepared solution (32 ounces).  Drink an eight (8) ounce cup every 10-15 minutes until you have consumed the remaining solution.     Bowel prep should be completed 4 hours prior to procedure time.    NOTHING TO EAT OR DRINK AFTER MIDNIGHT- EXCEPT FOR YOUR PREP    DAY OF THE PROCEDURE:  You may brush your teeth.  Leave all jewelry at home.  Please arrive for your procedure as indicated by the OR / GI Lab / Endoscopy Unit. The hospital will contact you the day before with your exact arrival time.   Make sure you have arranged ahead of time for a responsible adult (18 or older) to accompany and drive you home after the procedure.  Please discuss any transportation concerns with our staff prior to your procedure.    The effects of the anesthesia can persist for 24 hours.  After receiving the sedation, you must exercise caution before engaging in any activity that could harm yourself and others (such as driving a car).  Do not make any important decisions or do not drink any alcoholic beverages during this time period.  After your procedure, you may have anything you'd like to eat or drink.  You will probably want to start with something light.  Please include plenty of fluids.  Avoid items that cause gas such as sodas and salads.    SPECIAL INSTRUCTIONS:    For patients currently taking blood thinners and/or antiplatelet therapy our office will contact the prescribing provider.  Our office will contact you with any required changes to your medication regimen.     Blood thinner (i.e. - Coumadin, Pradaxa, Lovenox, Xarelto, Eliquis)  ?  Continue (Do Not Stop)  ? Stop______________for_____________days prior to the procedure.    Antiplatelet (i.e. - Plavix, Aggrenox, Effient, Brilinta)  ?  Continue (Do Not  Stop)  ? Stop______________for_____________days prior to the procedure.       Diabetes:   If you are Diabetic, please see separate Diabetic Instruction Sheet.          Prescribed medications:  Do not stop your aspirin, or any of your other medications (unless instructed otherwise).    Take the rest of your prescribed medications with small sips of water at least 2 hours prior to your procedure.      For any questions or concerns related to your bowel preparation or pre-procedure instructions, please contact our office at [phone].  Thank you for choosing [facility]!

## 2024-03-19 NOTE — TELEPHONE ENCOUNTER
Scheduled date of colonoscopy (as of today):  05.14.2024    Physician performing colonoscopy:  Dr. Brady    Location of colonoscopy:  [facility]     Bowel prep reviewed with patient:  Miralax/Dulcolax

## 2024-03-20 PROCEDURE — 88305 TISSUE EXAM BY PATHOLOGIST: CPT | Performed by: STUDENT IN AN ORGANIZED HEALTH CARE EDUCATION/TRAINING PROGRAM

## 2024-03-20 PROCEDURE — 88312 SPECIAL STAINS GROUP 1: CPT | Performed by: STUDENT IN AN ORGANIZED HEALTH CARE EDUCATION/TRAINING PROGRAM

## 2024-03-21 ENCOUNTER — TELEPHONE (OUTPATIENT)
Age: 54
End: 2024-03-21

## 2024-03-21 NOTE — TELEPHONE ENCOUNTER
Octavia for pt regarding Dr. Davis's message.     ----- Message from Mark Davis DPM sent at 3/21/2024 12:41 PM EDT -----  Could you please call Carlos and let him know that the results of his toenail sample were negative for onychomycosis.  Thank you  ----- Message -----  From: Lab, Background User  Sent: 3/20/2024   2:51 PM EDT  To: Mark Davis DPM

## 2024-03-22 LAB
ALBUMIN SERPL-MCNC: 4.2 G/DL (ref 3.8–4.9)
ALBUMIN/GLOB SERPL: 1.9 {RATIO} (ref 1.2–2.2)
ALP SERPL-CCNC: 57 IU/L (ref 44–121)
ALT SERPL-CCNC: 20 IU/L (ref 0–44)
AST SERPL-CCNC: 16 IU/L (ref 0–40)
BILIRUB SERPL-MCNC: <0.2 MG/DL (ref 0–1.2)
BUN SERPL-MCNC: 11 MG/DL (ref 6–24)
BUN/CREAT SERPL: 11 (ref 9–20)
CALCIUM SERPL-MCNC: 9.3 MG/DL (ref 8.7–10.2)
CHLORIDE SERPL-SCNC: 104 MMOL/L (ref 96–106)
CHOLEST SERPL-MCNC: 221 MG/DL (ref 100–199)
CO2 SERPL-SCNC: 23 MMOL/L (ref 20–29)
CREAT SERPL-MCNC: 1.01 MG/DL (ref 0.76–1.27)
EGFR: 88 ML/MIN/1.73
GLOBULIN SER-MCNC: 2.2 G/DL (ref 1.5–4.5)
GLUCOSE SERPL-MCNC: 92 MG/DL (ref 70–99)
HBA1C MFR BLD: 5.7 % (ref 4.8–5.6)
HDLC SERPL-MCNC: 50 MG/DL
LDLC SERPL CALC-MCNC: 161 MG/DL (ref 0–99)
MICRODELETION SYND BLD/T FISH: NORMAL
POTASSIUM SERPL-SCNC: 4.5 MMOL/L (ref 3.5–5.2)
PROT SERPL-MCNC: 6.4 G/DL (ref 6–8.5)
PSA SERPL-MCNC: 4.2 NG/ML (ref 0–4)
SODIUM SERPL-SCNC: 139 MMOL/L (ref 134–144)
TRIGL SERPL-MCNC: 56 MG/DL (ref 0–149)

## 2024-03-27 ENCOUNTER — TELEPHONE (OUTPATIENT)
Dept: FAMILY MEDICINE CLINIC | Facility: CLINIC | Age: 54
End: 2024-03-27

## 2024-03-27 DIAGNOSIS — R97.20 ELEVATED PSA: Primary | ICD-10-CM

## 2024-03-27 DIAGNOSIS — R97.20 INCREASED PROSTATE SPECIFIC ANTIGEN (PSA) VELOCITY: ICD-10-CM

## 2024-03-27 NOTE — TELEPHONE ENCOUNTER
Sw pt  Declines statin this year  Tob use discouraged  Repeat psa 1w and uro if elevated still  ----- Message from Delores Rashid sent at 3/25/2024  8:59 AM EDT -----  Regarding: FW: Labs  Contact: [phone]    ----- Message -----  From: Terri Moore  Sent: 3/25/2024   8:36 AM EDT  To: [facility]  Subject: FW: Labs                                           ----- Message -----  From: Carlos Handley  Sent: 3/23/2024  10:26 AM EDT  To: [facility]  Subject: Labs                                             The results came in yesterday

## 2024-03-27 NOTE — TELEPHONE ENCOUNTER
The 10-year ASCVD risk score (Fco HEREDIA, et al., 2019) is: 11.3%    Values used to calculate the score:      Age: 53 years      Sex: Male      Is Non- : No      Diabetic: No      Tobacco smoker: Yes      Systolic Blood Pressure: 130 mmHg      Is BP treated: No      HDL Cholesterol: 50 mg/dL      Total Cholesterol: 221 mg/dL          ----- Message from Delores Rashid sent at 3/25/2024  8:59 AM EDT -----  Regarding: FW: Labs  Contact: [phone]    ----- Message -----  From: Terri Moore  Sent: 3/25/2024   8:36 AM EDT  To: [facility]  Subject: FW: Labs                                           ----- Message -----  From: Carlos Handley  Sent: 3/23/2024  10:26 AM EDT  To: [facility]  Subject: Labs                                             The results came in yesterday

## 2024-04-05 LAB — PSA SERPL-MCNC: 3.5 NG/ML (ref 0–4)

## 2024-04-20 ENCOUNTER — HOSPITAL ENCOUNTER (OUTPATIENT)
Dept: RADIOLOGY | Facility: HOSPITAL | Age: 54
Discharge: HOME | End: 2024-04-20
Attending: FAMILY MEDICINE
Payer: COMMERCIAL

## 2024-04-20 DIAGNOSIS — Z72.0 TOBACCO USE: ICD-10-CM

## 2024-04-20 DIAGNOSIS — R51.9 NONINTRACTABLE EPISODIC HEADACHE, UNSPECIFIED HEADACHE TYPE: ICD-10-CM

## 2024-04-20 DIAGNOSIS — H02.402 PTOSIS OF EYELID, LEFT: ICD-10-CM

## 2024-04-20 PROCEDURE — A9585 GADOBUTROL INJECTION: HCPCS | Performed by: FAMILY MEDICINE

## 2024-04-20 PROCEDURE — 70553 MRI BRAIN STEM W/O & W/DYE: CPT

## 2024-04-20 PROCEDURE — 71271 CT THORAX LUNG CANCER SCR C-: CPT

## 2024-04-20 RX ORDER — GADOBUTROL 604.72 MG/ML
8 INJECTION INTRAVENOUS
Status: COMPLETED | OUTPATIENT
Start: 2024-04-20 | End: 2024-04-20

## 2024-04-20 RX ADMIN — GADOBUTROL 8 ML: 604.72 INJECTION INTRAVENOUS at 10:29

## 2024-05-01 PROBLEM — Z00.00 HEALTHCARE MAINTENANCE: Status: RESOLVED | Noted: 2024-03-11 | Resolved: 2024-05-01

## 2024-05-01 PROBLEM — Z12.11 COLON CANCER SCREENING: Status: RESOLVED | Noted: 2024-03-11 | Resolved: 2024-05-01

## 2024-05-09 ENCOUNTER — TELEPHONE (OUTPATIENT)
Dept: GASTROENTEROLOGY | Facility: CLINIC | Age: 54
End: 2024-05-09

## 2024-05-09 NOTE — TELEPHONE ENCOUNTER
Spoke with patient confirming 5/14 procedure with Dr. Brady. He has his  and prep. He will be called day prior with arrival time.

## 2024-05-14 ENCOUNTER — ANESTHESIA EVENT (OUTPATIENT)
Dept: GASTROENTEROLOGY | Facility: AMBULARY SURGERY CENTER | Age: 54
End: 2024-05-14

## 2024-05-14 ENCOUNTER — ANESTHESIA (OUTPATIENT)
Dept: GASTROENTEROLOGY | Facility: AMBULARY SURGERY CENTER | Age: 54
End: 2024-05-14

## 2024-05-14 ENCOUNTER — HOSPITAL ENCOUNTER (OUTPATIENT)
Dept: GASTROENTEROLOGY | Facility: AMBULARY SURGERY CENTER | Age: 54
Setting detail: OUTPATIENT SURGERY
Discharge: HOME | End: 2024-05-14
Attending: INTERNAL MEDICINE
Payer: COMMERCIAL

## 2024-05-14 VITALS
OXYGEN SATURATION: 99 % | SYSTOLIC BLOOD PRESSURE: 134 MMHG | HEART RATE: 70 BPM | TEMPERATURE: 96.8 F | DIASTOLIC BLOOD PRESSURE: 85 MMHG | RESPIRATION RATE: 18 BRPM

## 2024-05-14 DIAGNOSIS — Z12.11 SCREENING FOR COLON CANCER: ICD-10-CM

## 2024-05-14 PROCEDURE — 45385 COLONOSCOPY W/LESION REMOVAL: CPT | Performed by: INTERNAL MEDICINE

## 2024-05-14 PROCEDURE — 88305 TISSUE EXAM BY PATHOLOGIST: CPT | Performed by: PATHOLOGY

## 2024-05-14 RX ORDER — SODIUM CHLORIDE, SODIUM LACTATE, POTASSIUM CHLORIDE, CALCIUM CHLORIDE 600; 310; 30; 20 MG/100ML; MG/100ML; MG/100ML; MG/100ML
INJECTION, SOLUTION INTRAVENOUS CONTINUOUS PRN
Status: DISCONTINUED | OUTPATIENT
Start: 2024-05-14 | End: 2024-05-14

## 2024-05-14 RX ORDER — LIDOCAINE HYDROCHLORIDE 10 MG/ML
INJECTION, SOLUTION EPIDURAL; INFILTRATION; INTRACAUDAL; PERINEURAL AS NEEDED
Status: DISCONTINUED | OUTPATIENT
Start: 2024-05-14 | End: 2024-05-14

## 2024-05-14 RX ORDER — PROPOFOL 10 MG/ML
INJECTION, EMULSION INTRAVENOUS CONTINUOUS PRN
Status: DISCONTINUED | OUTPATIENT
Start: 2024-05-14 | End: 2024-05-14

## 2024-05-14 RX ORDER — SODIUM CHLORIDE, SODIUM LACTATE, POTASSIUM CHLORIDE, CALCIUM CHLORIDE 600; 310; 30; 20 MG/100ML; MG/100ML; MG/100ML; MG/100ML
125 INJECTION, SOLUTION INTRAVENOUS CONTINUOUS
Status: DISCONTINUED | OUTPATIENT
Start: 2024-05-14 | End: 2024-05-18 | Stop reason: HOSPADM

## 2024-05-14 RX ORDER — PROPOFOL 10 MG/ML
INJECTION, EMULSION INTRAVENOUS AS NEEDED
Status: DISCONTINUED | OUTPATIENT
Start: 2024-05-14 | End: 2024-05-14

## 2024-05-14 RX ADMIN — PROPOFOL 140 MCG/KG/MIN: 10 INJECTION, EMULSION INTRAVENOUS at 13:57

## 2024-05-14 RX ADMIN — LIDOCAINE HYDROCHLORIDE 50 MG: 10 INJECTION, SOLUTION EPIDURAL; INFILTRATION; INTRACAUDAL at 13:55

## 2024-05-14 RX ADMIN — SODIUM CHLORIDE, SODIUM LACTATE, POTASSIUM CHLORIDE, AND CALCIUM CHLORIDE 125 ML/HR: .6; .31; .03; .02 INJECTION, SOLUTION INTRAVENOUS at 13:33

## 2024-05-14 RX ADMIN — PROPOFOL 100 MG: 10 INJECTION, EMULSION INTRAVENOUS at 13:55

## 2024-05-14 RX ADMIN — SODIUM CHLORIDE, SODIUM LACTATE, POTASSIUM CHLORIDE, AND CALCIUM CHLORIDE: .6; .31; .03; .02 INJECTION, SOLUTION INTRAVENOUS at 13:51

## 2024-05-14 NOTE — H&P
History and Physical - [facility]  Carlos Handley 53 y.o. male MRN: [id number]    HPI: Carlos Handley is a 53 y.o. year old male who presents with screening colonoscopy      Review of Systems    Historical Information   History reviewed. No pertinent past medical history.  History reviewed. No pertinent surgical history.  Social History   Social History     Substance and Sexual Activity   Alcohol Use Yes    Alcohol/week: 5.0 standard drinks of alcohol    Types: 5 Glasses of wine per week     Social History     Substance and Sexual Activity   Drug Use Not on file     Social History     Tobacco Use   Smoking Status Former    Current packs/day: 1.00    Average packs/day: 1 pack/day for 3.6 years (3.6 ttl pk-yrs)    Types: Cigarettes    Start date: 9/21/2020    Quit date: 9/12/2020    Passive exposure: Never   Smokeless Tobacco Never     Family History   Problem Relation Age of Onset    No Known Problems Mother     No Known Problems Father        Meds/Allergies     (Not in a hospital admission)      No Known Allergies    Objective     /75   Pulse 84   Temp (!) 96.8 °F (36 °C) (Tympanic)   Resp 18   SpO2 99%       PHYSICAL EXAM    Gen: NAD  CV: RRR  CHEST: Clear  ABD: soft, NT/ND  EXT: no edema  Neuro: AAO      ASSESSMENT/PLAN:  This is a 53 y.o. year old male here for screening colonoscopy      PLAN:   Procedure: colonoscopy

## 2024-05-14 NOTE — ANESTHESIA POSTPROCEDURE EVALUATION
Post-Op Assessment Note    CV Status:  Stable  Pain Score: 0    Pain management: adequate       Mental Status:  Sleepy   Hydration Status:  Stable   PONV Controlled:  None   Airway Patency:  Patent  Two or more mitigation strategies used for obstructive sleep apnea   Post Op Vitals Reviewed: Yes    No anethesia notable event occurred.    Staff: CRNA               BP   109/54   Temp 97   Pulse 75   Resp 16   SpO2 98

## 2024-05-14 NOTE — ANESTHESIA PREPROCEDURE EVALUATION
Procedure:  COLONOSCOPY    Relevant Problems   Behavioral Health   (+) Tobacco use      Other   (+) Erectile dysfunction        Physical Exam    Airway    Mallampati score: II  TM Distance: >3 FB  Neck ROM: full     Dental   Comment: Denies loose teeth     Cardiovascular  Cardiovascular exam normal    Pulmonary  Pulmonary exam normal     Other Findings  Portions of exam deferred due to low yield and/or unknown COVID status      Anesthesia Plan  ASA Score- 2     Anesthesia Type- IV sedation with anesthesia with ASA Monitors.         Additional Monitors:     Airway Plan:            Plan Factors-Exercise tolerance (METS): >4 METS.    Chart reviewed.   Existing labs reviewed. Patient summary reviewed.    Patient is a current smoker.              Induction- intravenous.    Postoperative Plan-     Informed Consent- Anesthetic plan and risks discussed with patient.  I personally reviewed this patient with the CRNA. Discussed and agreed on the Anesthesia Plan with the CRNA..

## 2024-05-17 PROCEDURE — 88305 TISSUE EXAM BY PATHOLOGIST: CPT | Performed by: PATHOLOGY

## 2024-05-22 ENCOUNTER — TELEPHONE (OUTPATIENT)
Dept: GASTROENTEROLOGY | Facility: CLINIC | Age: 54
End: 2024-05-22

## 2024-05-22 NOTE — TELEPHONE ENCOUNTER
----- Message from Rhiannon ZHU sent at 5/20/2024  3:12 PM EDT -----    ----- Message -----  From: David Brady MD  Sent: 5/20/2024   1:03 PM EDT  To: [facility]    The 2 polyps that were removed were sessile serrated adenomas, these are precancerous polyps, there was no high-grade dysplasia and no cancer.    As we discussed, repeat colonoscopy in 7 years.    Please call with any questions or concerns

## 2024-05-22 NOTE — TELEPHONE ENCOUNTER
lmom asking patient to contact the office for results and recommendations  Placed Colon recall   Letter sent

## 2024-10-30 ENCOUNTER — OFFICE VISIT (OUTPATIENT)
Dept: FAMILY MEDICINE CLINIC | Facility: CLINIC | Age: 54
End: 2024-10-30
Payer: COMMERCIAL

## 2024-10-30 VITALS
OXYGEN SATURATION: 98 % | WEIGHT: 166 LBS | BODY MASS INDEX: 26.06 KG/M2 | RESPIRATION RATE: 18 BRPM | TEMPERATURE: 97.2 F | HEIGHT: 67 IN | SYSTOLIC BLOOD PRESSURE: 122 MMHG | HEART RATE: 76 BPM | DIASTOLIC BLOOD PRESSURE: 70 MMHG

## 2024-10-30 DIAGNOSIS — Z72.0 TOBACCO USE: ICD-10-CM

## 2024-10-30 DIAGNOSIS — B00.2 HERPES GINGIVOSTOMATITIS: ICD-10-CM

## 2024-10-30 DIAGNOSIS — B02.8 HERPES ZOSTER WITH COMPLICATION: Primary | ICD-10-CM

## 2024-10-30 DIAGNOSIS — B07.8 OTHER VIRAL WARTS: ICD-10-CM

## 2024-10-30 DIAGNOSIS — J06.9 ACUTE URI: ICD-10-CM

## 2024-10-30 PROCEDURE — 17110 DESTRUCTION B9 LES UP TO 14: CPT | Performed by: FAMILY MEDICINE

## 2024-10-30 PROCEDURE — 99214 OFFICE O/P EST MOD 30 MIN: CPT | Performed by: FAMILY MEDICINE

## 2024-10-30 RX ORDER — AMOXICILLIN 875 MG/1
875 TABLET, COATED ORAL 2 TIMES DAILY
Qty: 20 TABLET | Refills: 0 | Status: SHIPPED | OUTPATIENT
Start: 2024-10-30 | End: 2024-11-09

## 2024-10-30 RX ORDER — VALACYCLOVIR HYDROCHLORIDE 1 G/1
1000 TABLET, FILM COATED ORAL 3 TIMES DAILY
Qty: 21 TABLET | Refills: 0 | Status: SHIPPED | OUTPATIENT
Start: 2024-10-30 | End: 2024-11-06

## 2024-10-30 NOTE — PROGRESS NOTES
Assessment/Plan:    No problem-specific Assessment & Plan notes found for this encounter.    2 warts:  Cryo done  right shoulder  right #5 finger    Uri/pharyngitis  Amoxil given  Use otc for symptoms    Left C2 VZV  He did start valtrex w/I 72hrs  Complete course with 1mg tid advised for 21 doses  GFR noted  Contact precaution  F/u 1m if may need gabapentin    Tob use discouraged  Hsv labialis, valtrex prn per protocol     Diagnoses and all orders for this visit:    Herpes zoster with complication  -     valACYclovir (VALTREX) 1,000 mg tablet; Take 1 tablet (1,000 mg total) by mouth 3 (three) times a day for 7 days    Acute URI  -     amoxicillin (AMOXIL) 875 mg tablet; Take 1 tablet (875 mg total) by mouth 2 (two) times a day for 10 days    Herpes gingivostomatitis    Tobacco use    Other viral warts  -     Lesion Destruction        Return for Recheck.    Subjective:      Patient ID: Carlos Handley is a 54 y.o. male.    Chief Complaint   Patient presents with    Sore Throat       [id number]    painful rash L side of face      Started 3 days ago         HPI  Uri sx  Sore throat  5d  Left jaw rash then on left posterior neck  No fever  Rash started 72 hrs ago  Took 2 doses starting yesterday  Skin in sensitive in area    The following portions of the patient's history were reviewed and updated as appropriate: allergies, current medications, past family history, past medical history, past social history, past surgical history and problem list.    Review of Systems   Constitutional:  Negative for chills and fever.   Respiratory:  Negative for shortness of breath.          Current Outpatient Medications   Medication Sig Dispense Refill    amoxicillin (AMOXIL) 875 mg tablet Take 1 tablet (875 mg total) by mouth 2 (two) times a day for 10 days 20 tablet 0    cetirizine (ZyrTEC) 10 MG chewable tablet Chew 10 mg daily      sildenafil (VIAGRA) 100 mg tablet Take 1 tablet (100 mg total) by mouth daily as needed for erectile  "dysfunction 30 tablet 5    valACYclovir (VALTREX) 1,000 mg tablet Take 2 grams at earliest onset of cold sore then repeat once more in 12 hours (Patient taking differently: Take 2 grams at earliest onset of cold sore then repeat once more in 12 hours  As needed) 40 tablet 1    valACYclovir (VALTREX) 1,000 mg tablet Take 1 tablet (1,000 mg total) by mouth 3 (three) times a day for 7 days 21 tablet 0     No current facility-administered medications for this visit.       Objective:    /70   Pulse 76   Temp (!) 97.2 °F (36.2 °C)   Resp 18   Ht 5' 7\" (1.702 m)   Wt 75.3 kg (166 lb)   SpO2 98%   BMI 26.00 kg/m²        Physical Exam  Vitals and nursing note reviewed.   Constitutional:       General: He is not in acute distress.     Appearance: He is well-developed. He is not ill-appearing.   HENT:      Head: Normocephalic.      Right Ear: Tympanic membrane normal.      Left Ear: Tympanic membrane normal.      Mouth/Throat:      Mouth: Mucous membranes are moist.      Pharynx: Posterior oropharyngeal erythema present.   Eyes:      General: No scleral icterus.     Conjunctiva/sclera: Conjunctivae normal.   Cardiovascular:      Rate and Rhythm: Normal rate and regular rhythm.      Heart sounds: No murmur heard.  Pulmonary:      Effort: Pulmonary effort is normal. No respiratory distress.      Breath sounds: No wheezing.   Abdominal:      Palpations: Abdomen is soft.   Musculoskeletal:         General: No deformity.      Cervical back: Neck supple.   Skin:     General: Skin is warm and dry.      Coloration: Skin is not pale.      Findings: Lesion present.      Comments: Left C2 area vesicle clusters mid mandible and left occiput area    Wart right shoulder and right finger #5  F/u for bx if recurs   Neurological:      Mental Status: He is alert.      Motor: No weakness.      Gait: Gait normal.   Psychiatric:         Mood and Affect: Mood normal.         Behavior: Behavior normal.         Thought Content: Thought " content normal.       Lesion Destruction    Date/Time: 10/30/2024 1:45 PM    Performed by: Maverick Hernandez DO  Authorized by: Maverick Hernandez DO  Universal Protocol:  procedure performed by consultantConsent: Verbal consent not obtained. Written consent obtained.  Risks and benefits: risks, benefits and alternatives were discussed  Consent given by: patient    Procedure Details - Lesion Destruction:     Number of Lesions:  2  Lesion 1:     Body area:  Upper extremity    Upper extremity location:  R shoulder    Initial size (mm):  4    Final defect size (mm):  4    Malignancy: benign lesion      Destruction method: cryotherapy    Lesion 2:     Body area:  Upper extremity    Upper extremity location:  R small finger    Initial size (mm):  3    Final defect size (mm):  3    Malignancy: benign lesion      Destruction method: cryotherapy       Patient tolerated procedure well without any immediate post-procedural complications.  Given instructions for care of procedural site.               Maverick Hernandez DO

## 2024-11-29 PROBLEM — J06.9 ACUTE URI: Status: RESOLVED | Noted: 2024-10-30 | Resolved: 2024-11-29

## 2025-05-04 ENCOUNTER — OFFICE VISIT (OUTPATIENT)
Dept: URGENT CARE | Facility: CLINIC | Age: 55
End: 2025-05-04
Payer: COMMERCIAL

## 2025-05-04 VITALS
HEART RATE: 91 BPM | HEIGHT: 67 IN | SYSTOLIC BLOOD PRESSURE: 128 MMHG | WEIGHT: 161 LBS | OXYGEN SATURATION: 100 % | TEMPERATURE: 98 F | RESPIRATION RATE: 18 BRPM | BODY MASS INDEX: 25.27 KG/M2 | DIASTOLIC BLOOD PRESSURE: 78 MMHG

## 2025-05-04 DIAGNOSIS — Z23 ENCOUNTER FOR IMMUNIZATION: ICD-10-CM

## 2025-05-04 DIAGNOSIS — S61.012A LACERATION OF LEFT THUMB WITHOUT FOREIGN BODY, NAIL DAMAGE STATUS UNSPECIFIED, INITIAL ENCOUNTER: Primary | ICD-10-CM

## 2025-05-04 PROCEDURE — 90471 IMMUNIZATION ADMIN: CPT

## 2025-05-04 PROCEDURE — 99213 OFFICE O/P EST LOW 20 MIN: CPT | Performed by: PHYSICIAN ASSISTANT

## 2025-05-04 PROCEDURE — 12001 RPR S/N/AX/GEN/TRNK 2.5CM/<: CPT | Performed by: PHYSICIAN ASSISTANT

## 2025-05-04 PROCEDURE — 90715 TDAP VACCINE 7 YRS/> IM: CPT

## 2025-05-04 NOTE — PROGRESS NOTES
[facility]        NAME: Carlos Handley is a 54 y.o. male  : 1970    MRN: [id number]  DATE: May 4, 2025  TIME: 2:13 PM    Assessment and Plan   Encounter for immunization [Z23]  1. Encounter for immunization  Tdap Vaccine greater than or equal to 6yo      2. Laceration of left thumb without foreign body, nail damage status unspecified, initial encounter          Laceration repair    Date/Time: 2025 1:00 PM    Performed by: Arlet Norman PA-C  Authorized by: Arlet Norman PA-C  Body area: upper extremity  Location details: left thumb  Laceration length: 2 cm  Foreign bodies: no foreign bodies  Tendon involvement: none  Nerve involvement: none    Anesthesia:  Local Anesthetic: lidocaine 1% without epinephrine  Anesthetic total: 3 mL      Procedure Details:  Preparation: Patient was prepped and draped in the usual sterile fashion.  Irrigation solution: saline  Irrigation method: syringe  Amount of cleaning: standard  Debridement: none  Degree of undermining: none  Skin closure: 5-0 nylon  Number of sutures: 9  Technique: simple  Approximation: close  Approximation difficulty: simple  Dressing: non-adhesive packing strip and gauze roll  Patient tolerance: patient tolerated the procedure well with no immediate complications           Patient Instructions   Laceration of the left thumb:   -The patients wound was soaked in betadine. He was then prepped and draped in a sterile fashion and  nine sutures were placed. The patient tolerated the procedure well. The wound was then dressed.   -Tdap was given today   -Keep the wound clean, dry and covered for 48 hours. You can then take the dressing off and cleanse the wound with warm water and antibacterial soap. Keep the wound clean, dry and covered otherwise. Cleanse the wound daily. Keep the wound covered. You can apply bacitracin or neosporin onto the wound daily.   -Follow up with your PCP in two days for a wound check   -Advil or Tylenol  for the pain. You can ice the wound.   -Suture removal with PCP in ten days  -If the wound becomes red, swollen, more tender or begins to drain a purulent material follow up with PCP immediately.      Follow up with PCP in 3-5 days.  Proceed to  ER if symptoms worsen.    If tests have been performed at [facility], our office will contact you with results if changes need to be made to the care plan discussed with you at the visit.  [facility]    Chief Complaint     Chief Complaint   Patient presents with    Finger Laceration     Left thumb laceration from top of ball jar lid. Last TDAP unknown         History of Present Illness       The patient presents today for a laceration of the distal phalanx of the left thumb that occurred today. He states that he lacerated his left thumb on the top of a ball jar lid. His last TDAP is unknown. He has no weakness, numbness or tingling of the digit. He has full ROM of the thumb. No OTC measures.         Review of Systems   Review of Systems   Constitutional:  Negative for activity change, appetite change, chills, fatigue and fever.   Musculoskeletal:  Negative for arthralgias, back pain, gait problem, joint swelling, myalgias, neck pain and neck stiffness.   Skin:  Positive for wound. Negative for color change, pallor and rash.   Allergic/Immunologic: Negative for immunocompromised state.   Neurological:  Negative for dizziness, weakness, light-headedness and numbness.   Hematological:  Does not bruise/bleed easily.         Current Medications       Current Outpatient Medications:     cetirizine (ZyrTEC) 10 MG chewable tablet, Chew 10 mg daily, Disp: , Rfl:     sildenafil (VIAGRA) 100 mg tablet, Take 1 tablet (100 mg total) by mouth daily as needed for erectile dysfunction (Patient not taking: Reported on 5/4/2025), Disp: 30 tablet, Rfl: 5    valACYclovir (VALTREX) 1,000 mg tablet, Take 2 grams at earliest onset of cold sore then repeat  "once more in 12 hours (Patient taking differently: Take 2 grams at earliest onset of cold sore then repeat once more in 12 hours As needed), Disp: 40 tablet, Rfl: 1    valACYclovir (VALTREX) 1,000 mg tablet, Take 1 tablet (1,000 mg total) by mouth 3 (three) times a day for 7 days, Disp: 21 tablet, Rfl: 0    Current Allergies     Allergies as of 05/04/2025    (No Known Allergies)            The following portions of the patient's history were reviewed and updated as appropriate: allergies, current medications, past family history, past medical history, past social history, past surgical history and problem list.     No past medical history on file.    No past surgical history on file.    Family History   Problem Relation Age of Onset    No Known Problems Mother     No Known Problems Father          Medications have been verified.        Objective   /78   Pulse 91   Temp 98 °F (36.7 °C)   Resp 18   Ht 5' 7\" (1.702 m)   Wt 73 kg (161 lb)   SpO2 100%   BMI 25.22 kg/m²   No LMP for male patient.       Physical Exam     Physical Exam  Vitals and nursing note reviewed.   Constitutional:       General: He is not in acute distress.     Appearance: He is well-developed. He is not ill-appearing, toxic-appearing or diaphoretic.   Cardiovascular:      Rate and Rhythm: Normal rate and regular rhythm.      Heart sounds: Normal heart sounds, S1 normal and S2 normal.   Pulmonary:      Effort: Pulmonary effort is normal.      Breath sounds: Normal breath sounds and air entry.   Musculoskeletal:      Left hand: Laceration and tenderness present. No swelling, deformity or bony tenderness. Normal range of motion. Normal strength. Normal sensation. There is no disruption of two-point discrimination. Normal capillary refill. Normal pulse.      Comments: Laceration over the volar aspect of the left thumb over the distal phalanx. The wound is ~2cm in length. No nail involvement. He has full ROM of the thumb, sensation and " strength intact.    Skin:     General: Skin is warm and dry.      Capillary Refill: Capillary refill takes less than 2 seconds.      Findings: No bruising, ecchymosis or erythema.   Neurological:      General: No focal deficit present.      Mental Status: He is alert and oriented to person, place, and time.      Sensory: Sensation is intact. No sensory deficit.      Motor: Motor function is intact. No weakness or abnormal muscle tone.      Gait: Gait is intact. Gait normal.   Psychiatric:         Behavior: Behavior normal.

## 2025-05-04 NOTE — PATIENT INSTRUCTIONS
Laceration of the left thumb:   -The patients wound was soaked in betadine. He was then prepped and draped in a sterile fashion and  nine sutures were placed. The patient tolerated the procedure well. The wound was then dressed.   -Tdap was given today   -Keep the wound clean, dry and covered for 48 hours. You can then take the dressing off and cleanse the wound with warm water and antibacterial soap. Keep the wound clean, dry and covered otherwise. Cleanse the wound daily. Keep the wound covered. You can apply bacitracin or neosporin onto the wound daily.   -Follow up with your PCP in two days for a wound check   -Advil or Tylenol for the pain. You can ice the wound.   -Suture removal with PCP in ten days  -If the wound becomes red, swollen, more tender or begins to drain a purulent material follow up with PCP immediately.

## 2025-05-26 PROBLEM — S61.012A LACERATION OF LEFT THUMB WITHOUT FOREIGN BODY: Status: RESOLVED | Noted: 2025-05-04 | Resolved: 2025-05-26

## 2025-05-26 PROBLEM — B35.1 ONYCHOMYCOSIS: Status: RESOLVED | Noted: 2020-08-11 | Resolved: 2025-05-26

## 2025-05-26 PROBLEM — R51.9 NONINTRACTABLE EPISODIC HEADACHE: Status: RESOLVED | Noted: 2024-03-11 | Resolved: 2025-05-26

## 2025-05-26 PROBLEM — H02.402 PTOSIS OF EYELID, LEFT: Status: RESOLVED | Noted: 2024-03-11 | Resolved: 2025-05-26

## 2025-05-26 PROBLEM — B02.8 HERPES ZOSTER WITH COMPLICATION: Status: RESOLVED | Noted: 2024-10-30 | Resolved: 2025-05-26

## 2025-05-26 PROBLEM — N52.9 ERECTILE DYSFUNCTION: Status: RESOLVED | Noted: 2024-03-11 | Resolved: 2025-05-26

## 2025-05-30 ENCOUNTER — OFFICE VISIT (OUTPATIENT)
Dept: FAMILY MEDICINE CLINIC | Facility: CLINIC | Age: 55
End: 2025-05-30
Payer: COMMERCIAL

## 2025-05-30 VITALS
SYSTOLIC BLOOD PRESSURE: 136 MMHG | HEIGHT: 67 IN | BODY MASS INDEX: 24.96 KG/M2 | WEIGHT: 159 LBS | RESPIRATION RATE: 18 BRPM | DIASTOLIC BLOOD PRESSURE: 76 MMHG | TEMPERATURE: 97.1 F | HEART RATE: 82 BPM

## 2025-05-30 DIAGNOSIS — N52.9 ERECTILE DYSFUNCTION, UNSPECIFIED ERECTILE DYSFUNCTION TYPE: ICD-10-CM

## 2025-05-30 DIAGNOSIS — Z13.89 SCREENING FOR CARDIOVASCULAR, RESPIRATORY, AND GENITOURINARY DISEASES: ICD-10-CM

## 2025-05-30 DIAGNOSIS — Z12.5 ENCOUNTER FOR SCREENING FOR MALIGNANT NEOPLASM OF PROSTATE: ICD-10-CM

## 2025-05-30 DIAGNOSIS — F17.210 SMOKES WITH GREATER THAN 40 PACK YEAR HISTORY: ICD-10-CM

## 2025-05-30 DIAGNOSIS — Z13.1 SCREENING FOR DIABETES MELLITUS (DM): ICD-10-CM

## 2025-05-30 DIAGNOSIS — Z87.891 PERSONAL HISTORY OF NICOTINE DEPENDENCE: ICD-10-CM

## 2025-05-30 DIAGNOSIS — B00.2 HERPES GINGIVOSTOMATITIS: ICD-10-CM

## 2025-05-30 DIAGNOSIS — Z13.6 SCREENING FOR CARDIOVASCULAR, RESPIRATORY, AND GENITOURINARY DISEASES: ICD-10-CM

## 2025-05-30 DIAGNOSIS — Z13.83 SCREENING FOR CARDIOVASCULAR, RESPIRATORY, AND GENITOURINARY DISEASES: ICD-10-CM

## 2025-05-30 DIAGNOSIS — Z00.00 ANNUAL PHYSICAL EXAM: Primary | ICD-10-CM

## 2025-05-30 DIAGNOSIS — Z23 IMMUNIZATION DUE: ICD-10-CM

## 2025-05-30 PROCEDURE — 90471 IMMUNIZATION ADMIN: CPT

## 2025-05-30 PROCEDURE — 99396 PREV VISIT EST AGE 40-64: CPT | Performed by: FAMILY MEDICINE

## 2025-05-30 PROCEDURE — 90750 HZV VACC RECOMBINANT IM: CPT

## 2025-05-30 RX ORDER — SILDENAFIL 100 MG/1
100 TABLET, FILM COATED ORAL DAILY PRN
Qty: 30 TABLET | Refills: 5 | Status: SHIPPED | OUTPATIENT
Start: 2025-05-30

## 2025-05-30 RX ORDER — VALACYCLOVIR HYDROCHLORIDE 1 G/1
TABLET, FILM COATED ORAL
Qty: 40 TABLET | Refills: 1 | Status: SHIPPED | OUTPATIENT
Start: 2025-05-30 | End: 2027-08-09

## 2025-05-30 NOTE — PROGRESS NOTES
Adult Annual Physical  Name: Carlos Handley      : 1970      MRN: [id number]  Encounter Provider: Maverick Hernandez DO  Encounter Date: 2025   Encounter department: [facility]    :  Assessment & Plan  Personal history of nicotine dependence  Yearly per protocol  Tob cessation plan discussed  Orders:    CT Lung Screening Program; Future    Encounter for screening for malignant neoplasm of prostate  yearly  Orders:    PSA Total (Reflex To Free); Future    Screening for cardiovascular, respiratory, and genitourinary diseases    Orders:    Lipid Panel with Direct LDL reflex; Future    Screening for diabetes mellitus (DM)    Orders:    Comprehensive metabolic panel; Future    Hemoglobin A1C; Future    Immunization due  Given, #2 in 2-6m  Orders:    Zoster Vaccine Recombinant IM    Herpes gingivostomatitis  Valtrex use reviewed  UV lip balm  Orders:    valACYclovir (VALTREX) 1,000 mg tablet; Take 2 grams at earliest onset of cold sore then repeat once more in 12 hours    Erectile dysfunction, unspecified erectile dysfunction type  unchanged  Orders:    sildenafil (VIAGRA) 100 mg tablet; Take 1 tablet (100 mg total) by mouth daily as needed for erectile dysfunction    Smokes with greater than 40 pack year history  Risks aware           Immunizations:  - Immunizations due: Prevnar 20         History of Present Illness   No meds daily  Hsv lip 1x episode per year  Valtrex helps  Less episodes since avoiding aleve  Viagra effective  Lean meat  Low fat  Has veggies  Some carbs  Exercising most days  Smokes still 1ppd, over 40y, risks aware      Adult Annual Physical:  Patient presents for annual physical.     Diet and Physical Activity:  - Diet/Nutrition: no special diet.  - Exercise: walking, moderate cardiovascular exercise and 3-4 times a week on average.    Depression Screening:  - PHQ-2 Score: 0    General Health:  - Sleep: sleeps well and 7-8 hours of sleep on average.  - Hearing: decreased  "hearing bilateral ears.  - Vision: most recent eye exam > 1 year ago. Eye exam scheduled  - Dental: regular dental visits and brushes teeth twice daily.    /GYN Health:  - Follows with GYN: no.   - History of STDs: no     Health:  - History of STDs: no.   - Urinary symptoms: none.     Advanced Care Planning:  - Has an advanced directive?: yes    - Has a durable medical POA?: yes      Review of Systems   Constitutional:  Negative for chills and fever.         Objective   /76   Pulse 82   Temp (!) 97.1 °F (36.2 °C)   Resp 18   Ht 5' 6.75\" (1.695 m)   Wt 72.1 kg (159 lb)   BMI 25.09 kg/m²     Physical Exam  Vitals and nursing note reviewed.   Constitutional:       General: He is not in acute distress.     Appearance: He is well-developed. He is not ill-appearing.   HENT:      Head: Normocephalic.      Right Ear: Tympanic membrane normal.      Left Ear: Tympanic membrane normal.      Nose: No congestion.      Mouth/Throat:      Mouth: Mucous membranes are moist.     Eyes:      General: No scleral icterus.     Conjunctiva/sclera: Conjunctivae normal.     Neck:      Vascular: No carotid bruit.     Cardiovascular:      Rate and Rhythm: Normal rate and regular rhythm.      Heart sounds: No murmur heard.  Pulmonary:      Effort: Pulmonary effort is normal. No respiratory distress.      Breath sounds: No wheezing.   Abdominal:      General: There is no distension.      Palpations: Abdomen is soft.      Tenderness: There is no abdominal tenderness.      Hernia: No hernia is present.   Genitourinary:     Penis: Normal.       Testes: Normal.      Prostate: Normal.      Rectum: Normal.     Musculoskeletal:         General: No deformity.      Cervical back: Neck supple.      Right lower leg: No edema.      Left lower leg: No edema.     Skin:     General: Skin is warm and dry.      Coloration: Skin is not jaundiced or pale.     Neurological:      Mental Status: He is alert.      Motor: No weakness.      Gait: Gait " normal.     Psychiatric:         Mood and Affect: Mood normal.         Behavior: Behavior normal.         Thought Content: Thought content normal.

## 2025-05-31 NOTE — PATIENT INSTRUCTIONS
"Patient Education     Routine physical for adults   The Basics   Written by the doctors and editors at [facility]   What is a physical? -- A physical is a routine visit, or \"check-up,\" with your doctor. You might also hear it called a \"wellness visit\" or \"preventive visit.\"  During each visit, the doctor will:   Ask about your physical and mental health   Ask about your habits, behaviors, and lifestyle   Do an exam   Give you vaccines if needed   Talk to you about any medicines you take   Give advice about your health   Answer your questions  Getting regular check-ups is an important part of taking care of your health. It can help your doctor find and treat any problems you have. But it's also important for preventing health problems.  A routine physical is different from a \"sick visit.\" A sick visit is when you see a doctor because of a health concern or problem. Since physicals are scheduled ahead of time, you can think about what you want to ask the doctor.  How often should I get a physical? -- It depends on your age and health. In general, for people age 21 years and older:   If you are younger than 50 years, you might be able to get a physical every 3 years.   If you are 50 years or older, your doctor might recommend a physical every year.  If you have an ongoing health condition, like diabetes or high blood pressure, your doctor will probably want to see you more often.  What happens during a physical? -- In general, each visit will include:   Physical exam - The doctor or nurse will check your height, weight, heart rate, and blood pressure. They will also look at your eyes and ears. They will ask about how you are feeling and whether you have any symptoms that bother you.   Medicines - It's a good idea to bring a list of all the medicines you take to each doctor visit. Your doctor will talk to you about your medicines and answer any questions. Tell them if you are having any side effects that bother you. You " "should also tell them if you are having trouble paying for any of your medicines.   Habits and behaviors - This includes:   Your diet   Your exercise habits   Whether you smoke, drink alcohol, or use drugs   Whether you are sexually active   Whether you feel safe at home  Your doctor will talk to you about things you can do to improve your health and lower your risk of health problems. They will also offer help and support. For example, if you want to quit smoking, they can give you advice and might prescribe medicines. If you want to improve your diet or get more physical activity, they can help you with this, too.   Lab tests, if needed - The tests you get will depend on your age and situation. For example, your doctor might want to check your:   Cholesterol   Blood sugar   Iron level   Vaccines - The recommended vaccines will depend on your age, health, and what vaccines you already had. Vaccines are very important because they can prevent certain serious or deadly infections.   Discussion of screening - \"Screening\" means checking for diseases or other health problems before they cause symptoms. Your doctor can recommend screening based on your age, risk, and preferences. This might include tests to check for:   Cancer, such as breast, prostate, cervical, ovarian, colorectal, prostate, lung, or skin cancer   Sexually transmitted infections, such as chlamydia and gonorrhea   Mental health conditions like depression and anxiety  Your doctor will talk to you about the different types of screening tests. They can help you decide which screenings to have. They can also explain what the results might mean.   Answering questions - The physical is a good time to ask the doctor or nurse questions about your health. If needed, they can refer you to other doctors or specialists, too.  Adults older than 65 years often need other care, too. As you get older, your doctor will talk to you about:   How to prevent falling at " home   Hearing or vision tests   Memory testing   How to take your medicines safely   Making sure that you have the help and support you need at home  All topics are updated as new evidence becomes available and our peer review process is complete.  This topic retrieved from [employer] on: May 02, 2024.  Topic 456481 Version 1.0  Release: 32.4.3 - C32.122  © 2024 UpToDate, Inc. and/or its affiliates. All rights reserved.  Consumer Information Use and Disclaimer   Disclaimer: This generalized information is a limited summary of diagnosis, treatment, and/or medication information. It is not meant to be comprehensive and should be used as a tool to help the user understand and/or assess potential diagnostic and treatment options. It does NOT include all information about conditions, treatments, medications, side effects, or risks that may apply to a specific patient. It is not intended to be medical advice or a substitute for the medical advice, diagnosis, or treatment of a health care provider based on the health care provider's examination and assessment of a patient's specific and unique circumstances. Patients must speak with a health care provider for complete information about their health, medical questions, and treatment options, including any risks or benefits regarding use of medications. This information does not endorse any treatments or medications as safe, effective, or approved for treating a specific patient. UpToDate, Inc. and its affiliates disclaim any warranty or liability relating to this information or the use thereof.The use of this information is governed by the Terms of Use, available at https://www.[employer].com/en/know/clinical-effectiveness-terms. 2024© UpToDate, Inc. and its affiliates and/or licensors. All rights reserved.  Copyright   © 2024 UpToDate, Inc. and/or its affiliates. All rights reserved.

## 2025-05-31 NOTE — ASSESSMENT & PLAN NOTE
Valtrex use reviewed  UV lip balm  Orders:    valACYclovir (VALTREX) 1,000 mg tablet; Take 2 grams at earliest onset of cold sore then repeat once more in 12 hours

## 2025-05-31 NOTE — ASSESSMENT & PLAN NOTE
unchanged  Orders:    sildenafil (VIAGRA) 100 mg tablet; Take 1 tablet (100 mg total) by mouth daily as needed for erectile dysfunction

## 2025-06-19 ENCOUNTER — NEW PATIENT (OUTPATIENT)
Dept: URBAN - METROPOLITAN AREA CLINIC 6 | Facility: CLINIC | Age: 55
End: 2025-06-19

## 2025-06-19 DIAGNOSIS — H25.13: ICD-10-CM

## 2025-06-19 DIAGNOSIS — H52.4: ICD-10-CM

## 2025-06-19 PROCEDURE — 92015 DETERMINE REFRACTIVE STATE: CPT

## 2025-06-19 PROCEDURE — 92004 COMPRE OPH EXAM NEW PT 1/>: CPT

## 2025-06-19 ASSESSMENT — VISUAL ACUITY
OS_CC: 20/20
OD_CC: 20/25

## 2025-06-19 ASSESSMENT — TONOMETRY
OD_IOP_MMHG: 13
OS_IOP_MMHG: 13

## 2025-06-20 ENCOUNTER — RESULTS FOLLOW-UP (OUTPATIENT)
Dept: FAMILY MEDICINE CLINIC | Facility: CLINIC | Age: 55
End: 2025-06-20

## 2025-06-20 LAB
ALBUMIN SERPL-MCNC: 4.5 G/DL (ref 3.8–4.9)
ALP SERPL-CCNC: 66 IU/L (ref 44–121)
ALT SERPL-CCNC: 15 IU/L (ref 0–44)
AST SERPL-CCNC: 14 IU/L (ref 0–40)
BILIRUB SERPL-MCNC: 0.5 MG/DL (ref 0–1.2)
BUN SERPL-MCNC: 11 MG/DL (ref 6–24)
BUN/CREAT SERPL: 11 (ref 9–20)
CALCIUM SERPL-MCNC: 9.4 MG/DL (ref 8.7–10.2)
CHLORIDE SERPL-SCNC: 104 MMOL/L (ref 96–106)
CHOLEST SERPL-MCNC: 242 MG/DL (ref 100–199)
CO2 SERPL-SCNC: 22 MMOL/L (ref 20–29)
CREAT SERPL-MCNC: 0.99 MG/DL (ref 0.76–1.27)
EGFR: 90 ML/MIN/1.73
EST. AVERAGE GLUCOSE BLD GHB EST-MCNC: 114 MG/DL
GLOBULIN SER-MCNC: 2.1 G/DL (ref 1.5–4.5)
GLUCOSE SERPL-MCNC: 88 MG/DL (ref 70–99)
HBA1C MFR BLD: 5.6 % (ref 4.8–5.6)
HDLC SERPL-MCNC: 50 MG/DL
LDLC SERPL CALC-MCNC: 179 MG/DL (ref 0–99)
LDLC/HDLC SERPL: 3.6 RATIO (ref 0–3.6)
MICRODELETION SYND BLD/T FISH: NORMAL
POTASSIUM SERPL-SCNC: 4.6 MMOL/L (ref 3.5–5.2)
PROT SERPL-MCNC: 6.6 G/DL (ref 6–8.5)
PSA SERPL-MCNC: 3.3 NG/ML (ref 0–4)
SL AMB REFLEX CRITERIA: NORMAL
SL AMB VLDL CHOLESTEROL CALC: 13 MG/DL (ref 5–40)
SODIUM SERPL-SCNC: 140 MMOL/L (ref 134–144)
TRIGL SERPL-MCNC: 78 MG/DL (ref 0–149)

## 2025-08-14 ENCOUNTER — HOSPITAL ENCOUNTER (OUTPATIENT)
Dept: RADIOLOGY | Facility: HOSPITAL | Age: 55
Discharge: HOME | End: 2025-08-14
Attending: FAMILY MEDICINE
Payer: COMMERCIAL